# Patient Record
Sex: MALE | Race: WHITE | Employment: FULL TIME | URBAN - METROPOLITAN AREA
[De-identification: names, ages, dates, MRNs, and addresses within clinical notes are randomized per-mention and may not be internally consistent; named-entity substitution may affect disease eponyms.]

---

## 2017-03-27 ENCOUNTER — OFFICE VISIT (OUTPATIENT)
Dept: PRIMARY CARE CLINIC | Age: 42
End: 2017-03-27

## 2017-03-27 VITALS
RESPIRATION RATE: 16 BRPM | HEART RATE: 78 BPM | SYSTOLIC BLOOD PRESSURE: 120 MMHG | WEIGHT: 230 LBS | DIASTOLIC BLOOD PRESSURE: 84 MMHG | TEMPERATURE: 97.7 F | BODY MASS INDEX: 31.15 KG/M2 | OXYGEN SATURATION: 96 % | HEIGHT: 72 IN

## 2017-03-27 DIAGNOSIS — L73.8 FOLLICULITIS BARBAE: Primary | ICD-10-CM

## 2017-03-27 PROCEDURE — 99213 OFFICE O/P EST LOW 20 MIN: CPT | Performed by: INTERNAL MEDICINE

## 2017-03-27 RX ORDER — CEPHALEXIN 500 MG/1
500 CAPSULE ORAL 4 TIMES DAILY
Qty: 40 CAPSULE | Refills: 0 | Status: SHIPPED | OUTPATIENT
Start: 2017-03-27 | End: 2017-05-12

## 2017-03-27 RX ORDER — SULFAMETHOXAZOLE AND TRIMETHOPRIM 800; 160 MG/1; MG/1
1 TABLET ORAL 2 TIMES DAILY
Qty: 20 TABLET | Refills: 0 | Status: SHIPPED | OUTPATIENT
Start: 2017-03-27 | End: 2017-04-06

## 2017-03-27 ASSESSMENT — PATIENT HEALTH QUESTIONNAIRE - PHQ9
1. LITTLE INTEREST OR PLEASURE IN DOING THINGS: 0
SUM OF ALL RESPONSES TO PHQ9 QUESTIONS 1 & 2: 0
SUM OF ALL RESPONSES TO PHQ QUESTIONS 1-9: 0
2. FEELING DOWN, DEPRESSED OR HOPELESS: 0

## 2017-03-30 ASSESSMENT — ENCOUNTER SYMPTOMS
CHOKING: 0
PHOTOPHOBIA: 0
SHORTNESS OF BREATH: 0
VOMITING: 0
TROUBLE SWALLOWING: 0
VOICE CHANGE: 0
NAUSEA: 0

## 2017-05-12 ENCOUNTER — OFFICE VISIT (OUTPATIENT)
Dept: PRIMARY CARE CLINIC | Age: 42
End: 2017-05-12

## 2017-05-12 VITALS
HEART RATE: 70 BPM | SYSTOLIC BLOOD PRESSURE: 128 MMHG | BODY MASS INDEX: 30.03 KG/M2 | WEIGHT: 221.7 LBS | TEMPERATURE: 97.7 F | HEIGHT: 72 IN | DIASTOLIC BLOOD PRESSURE: 80 MMHG | RESPIRATION RATE: 14 BRPM

## 2017-05-12 DIAGNOSIS — E78.2 MIXED HYPERLIPIDEMIA: Primary | ICD-10-CM

## 2017-05-12 DIAGNOSIS — R59.0 CERVICAL LYMPHADENOPATHY: ICD-10-CM

## 2017-05-12 DIAGNOSIS — H61.23 BILATERAL IMPACTED CERUMEN: ICD-10-CM

## 2017-05-12 PROCEDURE — 99213 OFFICE O/P EST LOW 20 MIN: CPT | Performed by: FAMILY MEDICINE

## 2017-05-12 ASSESSMENT — ENCOUNTER SYMPTOMS
COUGH: 0
SHORTNESS OF BREATH: 0
CHANGE IN BOWEL HABIT: 0
NAUSEA: 1
WHEEZING: 0
VOMITING: 0
DIARRHEA: 0
RHINORRHEA: 0
ABDOMINAL PAIN: 0
SORE THROAT: 0
VISUAL CHANGE: 0
CONSTIPATION: 0
SWOLLEN GLANDS: 0
BACK PAIN: 0

## 2017-07-27 ENCOUNTER — OFFICE VISIT (OUTPATIENT)
Dept: PRIMARY CARE CLINIC | Age: 42
End: 2017-07-27

## 2017-07-27 VITALS
RESPIRATION RATE: 16 BRPM | HEART RATE: 76 BPM | WEIGHT: 224 LBS | OXYGEN SATURATION: 97 % | TEMPERATURE: 95.5 F | SYSTOLIC BLOOD PRESSURE: 120 MMHG | BODY MASS INDEX: 26.45 KG/M2 | HEIGHT: 77 IN | DIASTOLIC BLOOD PRESSURE: 80 MMHG

## 2017-07-27 DIAGNOSIS — J01.00 ACUTE NON-RECURRENT MAXILLARY SINUSITIS: Primary | ICD-10-CM

## 2017-07-27 DIAGNOSIS — J30.2 SEASONAL ALLERGIC RHINITIS, UNSPECIFIED ALLERGIC RHINITIS TRIGGER: ICD-10-CM

## 2017-07-27 PROCEDURE — 99213 OFFICE O/P EST LOW 20 MIN: CPT | Performed by: FAMILY MEDICINE

## 2017-07-27 RX ORDER — AMOXICILLIN AND CLAVULANATE POTASSIUM 875; 125 MG/1; MG/1
1 TABLET, FILM COATED ORAL 2 TIMES DAILY
Qty: 20 TABLET | Refills: 0 | Status: SHIPPED | OUTPATIENT
Start: 2017-07-27 | End: 2017-08-06

## 2017-07-27 RX ORDER — IPRATROPIUM BROMIDE 42 UG/1
2 SPRAY, METERED NASAL 2 TIMES DAILY
Qty: 1 BOTTLE | Refills: 3 | Status: SHIPPED | OUTPATIENT
Start: 2017-07-27 | End: 2018-07-27

## 2017-07-27 ASSESSMENT — ENCOUNTER SYMPTOMS
SHORTNESS OF BREATH: 1
CONSTIPATION: 0
HOARSE VOICE: 0
SINUS PRESSURE: 1
ABDOMINAL PAIN: 0
SWOLLEN GLANDS: 0
NAUSEA: 0
DIARRHEA: 0
COUGH: 0
WHEEZING: 0
VOMITING: 0
SORE THROAT: 0
BACK PAIN: 0

## 2018-01-11 RX ORDER — CEFUROXIME AXETIL 500 MG/1
500 TABLET ORAL 2 TIMES DAILY
Qty: 20 TABLET | Refills: 0 | Status: SHIPPED | OUTPATIENT
Start: 2018-01-11 | End: 2018-01-21

## 2018-06-05 ENCOUNTER — HOSPITAL ENCOUNTER (OUTPATIENT)
Dept: GENERAL RADIOLOGY | Age: 43
Discharge: HOME | End: 2018-06-05

## 2018-06-05 DIAGNOSIS — S23.9XXA THORACIC SPRAIN: ICD-10-CM

## 2018-06-05 DIAGNOSIS — S16.1XXA STRAIN OF NECK MUSCLE, INITIAL ENCOUNTER: ICD-10-CM

## 2019-06-14 ENCOUNTER — TELEPHONE (OUTPATIENT)
Dept: ADMINISTRATIVE | Age: 44
End: 2019-06-14

## 2023-08-01 ENCOUNTER — TELEPHONE (OUTPATIENT)
Dept: PRIMARY CARE | Facility: CLINIC | Age: 48
End: 2023-08-01
Payer: COMMERCIAL

## 2023-08-01 NOTE — TELEPHONE ENCOUNTER
PT OF [facility] CALLED IN AVERAGING 160/100 IN BP. PT WAS TOLD BY SPECIALIST (PSYCHIATRIST) THAT HE SHOULD CONTACT HIS PCP TO INFORM HIM OF HIS BP.     PT WOULD LIKE TO KNOW WHAT HE SHOULD DO UNTIL HIS APT ON 08/22.     08/01/23 162/103    PT HAS EXPERIENCE SLIGHT DIFFICULTY BREATHING, DIZZY SPELLS.

## 2023-08-02 ENCOUNTER — APPOINTMENT (OUTPATIENT)
Dept: PRIMARY CARE | Facility: CLINIC | Age: 48
End: 2023-08-02
Payer: COMMERCIAL

## 2023-08-22 ENCOUNTER — OFFICE VISIT (OUTPATIENT)
Dept: PRIMARY CARE | Facility: CLINIC | Age: 48
End: 2023-08-22
Payer: COMMERCIAL

## 2023-08-22 VITALS
DIASTOLIC BLOOD PRESSURE: 90 MMHG | WEIGHT: 254.2 LBS | BODY MASS INDEX: 30.95 KG/M2 | HEIGHT: 76 IN | OXYGEN SATURATION: 97 % | SYSTOLIC BLOOD PRESSURE: 142 MMHG | HEART RATE: 65 BPM

## 2023-08-22 DIAGNOSIS — E66.09 EXOGENOUS OBESITY: ICD-10-CM

## 2023-08-22 DIAGNOSIS — F43.10 POSTTRAUMATIC STRESS DISORDER: ICD-10-CM

## 2023-08-22 DIAGNOSIS — I10 PRIMARY HYPERTENSION: Primary | ICD-10-CM

## 2023-08-22 DIAGNOSIS — F32.9 REACTIVE DEPRESSION: ICD-10-CM

## 2023-08-22 PROBLEM — R59.9 REACTIVE LYMPHADENOPATHY: Status: ACTIVE | Noted: 2023-08-22

## 2023-08-22 PROCEDURE — 99212 OFFICE O/P EST SF 10 MIN: CPT | Performed by: FAMILY MEDICINE

## 2023-08-22 RX ORDER — MIRTAZAPINE 15 MG/1
15 TABLET, FILM COATED ORAL NIGHTLY
COMMUNITY
Start: 2023-08-18 | End: 2023-10-30 | Stop reason: ALTCHOICE

## 2023-08-22 RX ORDER — PRAZOSIN HYDROCHLORIDE 2 MG/1
2 CAPSULE ORAL NIGHTLY
COMMUNITY
Start: 2023-08-18

## 2023-08-22 RX ORDER — PAROXETINE 30 MG/1
30 TABLET, FILM COATED ORAL EVERY MORNING
COMMUNITY

## 2023-08-22 RX ORDER — ERGOCALCIFEROL 1.25 MG/1
50000 CAPSULE ORAL
COMMUNITY

## 2023-08-22 RX ORDER — PAROXETINE HYDROCHLORIDE 20 MG/1
20 TABLET, FILM COATED ORAL
COMMUNITY
Start: 2023-08-18 | End: 2023-08-22 | Stop reason: WASHOUT

## 2023-08-22 RX ORDER — PAROXETINE 30 MG/1
60 TABLET, FILM COATED ORAL DAILY
COMMUNITY
End: 2023-08-22 | Stop reason: WASHOUT

## 2023-08-22 RX ORDER — HYDROCODONE BITARTRATE AND ACETAMINOPHEN 5; 325 MG/1; MG/1
1 TABLET ORAL EVERY 6 HOURS PRN
COMMUNITY
Start: 2022-12-08 | End: 2023-08-22 | Stop reason: WASHOUT

## 2023-08-22 RX ORDER — LOSARTAN POTASSIUM 100 MG/1
100 TABLET ORAL DAILY
Qty: 30 TABLET | Refills: 2 | Status: SHIPPED | OUTPATIENT
Start: 2023-08-22 | End: 2023-09-20

## 2023-08-22 ASSESSMENT — ENCOUNTER SYMPTOMS
SINUS PRESSURE: 0
FEVER: 0
MYALGIAS: 0
SEIZURES: 0
CONSTIPATION: 0
ARTHRALGIAS: 0
DECREASED CONCENTRATION: 0
COLOR CHANGE: 0
HEMATURIA: 0
CHEST TIGHTNESS: 0
PALPITATIONS: 0
EYE PAIN: 0
COUGH: 0
BLOOD IN STOOL: 0
POLYDIPSIA: 0
CONSTITUTIONAL NEGATIVE: 1
ADENOPATHY: 0
RHINORRHEA: 0
NERVOUS/ANXIOUS: 0
SLEEP DISTURBANCE: 0
HEADACHES: 0
DIZZINESS: 1
ABDOMINAL DISTENTION: 0
DIARRHEA: 0
APPETITE CHANGE: 0
ABDOMINAL PAIN: 0
PHOTOPHOBIA: 0
STRIDOR: 0
NECK STIFFNESS: 0
SINUS PAIN: 0
SPEECH DIFFICULTY: 0
FATIGUE: 0
RECTAL PAIN: 0
DYSURIA: 0
TROUBLE SWALLOWING: 0
ACTIVITY CHANGE: 0
POLYPHAGIA: 0
CONFUSION: 0
SORE THROAT: 0
AGITATION: 0
DYSPHORIC MOOD: 0
SHORTNESS OF BREATH: 0
FLANK PAIN: 0

## 2023-08-22 ASSESSMENT — PATIENT HEALTH QUESTIONNAIRE - PHQ9
SUM OF ALL RESPONSES TO PHQ9 QUESTIONS 1 AND 2: 0
2. FEELING DOWN, DEPRESSED OR HOPELESS: NOT AT ALL
1. LITTLE INTEREST OR PLEASURE IN DOING THINGS: NOT AT ALL

## 2023-08-22 NOTE — PATIENT INSTRUCTIONS
Follow up 5-10 days after ECHO     Continue current medications and therapy for chronic medical conditions.    Patient was advised importance of proper diet/nutrition in addition adequate hydration. Patient was encouraged moderate exercise program to include 30 minutes daily for 5 days of the week or 150 minutes weekly. Patient will follow-up with us as scheduled.    ECHO ordered     CMP, Lipid and HEP C antibody ordered     Start Losartan 100mg daily

## 2023-08-22 NOTE — PROGRESS NOTES
"Subjective   Patient ID: Justin Hanna is a 48 y.o. male who presents for Hypertension.    Patient has noticed from other doctors he has had elevated blood pressure. Patient states he has noticed at time he has dizziness and trouble catching his breath but is unsure if it is from his elevated blood pressure or from his head injury through [facility]. Patient is currently not on an medications for HTN and does not check his BP at home.     Patient denies any other concerns at this time.          Review of Systems   Constitutional: Negative.  Negative for activity change, appetite change, fatigue and fever.   HENT:  Negative for congestion, dental problem, ear discharge, ear pain, mouth sores, rhinorrhea, sinus pressure, sinus pain, sore throat, tinnitus and trouble swallowing.    Eyes:  Negative for photophobia, pain and visual disturbance.   Respiratory:  Negative for cough, chest tightness, shortness of breath and stridor.    Cardiovascular:  Negative for chest pain and palpitations.   Gastrointestinal:  Negative for abdominal distention, abdominal pain, blood in stool, constipation, diarrhea and rectal pain.   Endocrine: Negative for cold intolerance, heat intolerance, polydipsia, polyphagia and polyuria.   Genitourinary:  Negative for dysuria, flank pain, hematuria and urgency.   Musculoskeletal:  Negative for arthralgias, gait problem, myalgias and neck stiffness.   Skin:  Negative for color change and rash.   Allergic/Immunologic: Negative for environmental allergies and food allergies.   Neurological:  Positive for dizziness. Negative for seizures, syncope, speech difficulty and headaches.   Hematological:  Negative for adenopathy.   Psychiatric/Behavioral:  Negative for agitation, confusion, decreased concentration, dysphoric mood and sleep disturbance. The patient is not nervous/anxious.        Objective   /90   Pulse 65   Ht 1.93 m (6' 4\")   Wt 115 kg (254 lb 3.2 oz)   SpO2 97%   BMI 30.94 kg/m² "     Physical Exam  Vitals reviewed.   Constitutional:       General: He is not in acute distress.     Appearance: He is obese. He is not ill-appearing or diaphoretic.   HENT:      Head: Normocephalic.      Right Ear: Tympanic membrane and external ear normal.      Left Ear: Tympanic membrane and external ear normal.      Nose: Nose normal. No congestion.      Mouth/Throat:      Pharynx: No posterior oropharyngeal erythema.   Eyes:      General:         Right eye: No discharge.         Left eye: No discharge.      Extraocular Movements: Extraocular movements intact.      Conjunctiva/sclera: Conjunctivae normal.      Pupils: Pupils are equal, round, and reactive to light.   Cardiovascular:      Rate and Rhythm: Normal rate and regular rhythm.      Pulses: Normal pulses.      Heart sounds: Normal heart sounds. No murmur heard.  Pulmonary:      Effort: Pulmonary effort is normal. No respiratory distress.      Breath sounds: Normal breath sounds. No wheezing or rales.   Chest:      Chest wall: No tenderness.   Abdominal:      General: Bowel sounds are normal. There is distension.      Palpations: There is no mass.      Tenderness: There is no abdominal tenderness. There is no guarding.   Musculoskeletal:         General: No tenderness. Normal range of motion.      Cervical back: Normal range of motion and neck supple. No tenderness.      Right lower leg: No edema.      Left lower leg: No edema.   Skin:     General: Skin is warm and dry.      Coloration: Skin is not jaundiced.      Findings: No bruising or erythema.   Neurological:      General: No focal deficit present.      Mental Status: He is alert and oriented to person, place, and time. Mental status is at baseline.      Cranial Nerves: No cranial nerve deficit.      Sensory: No sensory deficit.      Coordination: Coordination normal.      Gait: Gait normal.   Psychiatric:         Mood and Affect: Mood normal.         Thought Content: Thought content normal.          Judgment: Judgment normal.         Assessment/Plan   Problem List Items Addressed This Visit       Posttraumatic stress disorder     Other Visit Diagnoses       Primary hypertension    -  Primary    Relevant Medications    losartan (Cozaar) 100 mg tablet    Other Relevant Orders    Transthoracic Echo (TTE) Complete    Comprehensive metabolic panel    Hepatitis C antibody    Lipid panel    Exogenous obesity        Reactive depression               Scribe Attestation  By signing my name below, ITri , Scribe   attest that this documentation has been prepared under the direction and in the presence of Elpidio Jean Baptiste DO.  Provider Attestation - Scribe documentation  All medical record entries made by the Scribe were at my direction and personally dictated by me. I have reviewed the chart and agree that the record accurately reflects my personal performance of the history, physical exam, discussion and plan.

## 2023-09-07 ENCOUNTER — TELEPHONE (OUTPATIENT)
Dept: PRIMARY CARE | Facility: CLINIC | Age: 48
End: 2023-09-07

## 2023-09-20 DIAGNOSIS — I10 PRIMARY HYPERTENSION: ICD-10-CM

## 2023-09-20 RX ORDER — LOSARTAN POTASSIUM 100 MG/1
100 TABLET ORAL DAILY
Qty: 30 TABLET | Refills: 2 | Status: SHIPPED | OUTPATIENT
Start: 2023-09-20 | End: 2023-10-30 | Stop reason: ALTCHOICE

## 2023-10-30 ENCOUNTER — OFFICE VISIT (OUTPATIENT)
Dept: PRIMARY CARE | Facility: CLINIC | Age: 48
End: 2023-10-30
Payer: COMMERCIAL

## 2023-10-30 VITALS
HEART RATE: 87 BPM | OXYGEN SATURATION: 95 % | BODY MASS INDEX: 30.2 KG/M2 | SYSTOLIC BLOOD PRESSURE: 126 MMHG | HEIGHT: 76 IN | RESPIRATION RATE: 16 BRPM | DIASTOLIC BLOOD PRESSURE: 74 MMHG | WEIGHT: 248 LBS

## 2023-10-30 DIAGNOSIS — I10 PRIMARY HYPERTENSION: Primary | ICD-10-CM

## 2023-10-30 DIAGNOSIS — I27.20 PULMONARY HYPERTENSION (MULTI): ICD-10-CM

## 2023-10-30 DIAGNOSIS — J30.9 ALLERGIC RHINITIS, UNSPECIFIED SEASONALITY, UNSPECIFIED TRIGGER: ICD-10-CM

## 2023-10-30 DIAGNOSIS — R06.02 SHORTNESS OF BREATH: ICD-10-CM

## 2023-10-30 DIAGNOSIS — I51.7 RIGHT VENTRICULAR HYPERTROPHY: ICD-10-CM

## 2023-10-30 PROCEDURE — 99212 OFFICE O/P EST SF 10 MIN: CPT | Performed by: FAMILY MEDICINE

## 2023-10-30 RX ORDER — VIT C/E/ZN/COPPR/LUTEIN/ZEAXAN 250MG-90MG
1 CAPSULE ORAL
COMMUNITY

## 2023-10-30 RX ORDER — LOSARTAN POTASSIUM AND HYDROCHLOROTHIAZIDE 12.5; 5 MG/1; MG/1
1 TABLET ORAL DAILY
Qty: 30 TABLET | Refills: 2 | Status: SHIPPED | OUTPATIENT
Start: 2023-10-30 | End: 2023-11-21

## 2023-10-30 RX ORDER — FLUTICASONE PROPIONATE 50 MCG
1 SPRAY, SUSPENSION (ML) NASAL DAILY
Qty: 16 G | Refills: 1 | Status: SHIPPED | OUTPATIENT
Start: 2023-10-30 | End: 2023-11-21

## 2023-10-30 RX ORDER — AA/PROT/LYSINE/METHIO/VIT C/B6 50-12.5 MG
10 TABLET ORAL
COMMUNITY

## 2023-10-30 RX ORDER — MIRTAZAPINE 7.5 MG/1
7.5 TABLET, FILM COATED ORAL NIGHTLY
COMMUNITY
Start: 2023-10-05

## 2023-10-30 ASSESSMENT — ENCOUNTER SYMPTOMS
ABDOMINAL PAIN: 0
CONSTITUTIONAL NEGATIVE: 1
CONFUSION: 0
EYE PAIN: 0
SINUS PAIN: 0
SORE THROAT: 0
SINUS PRESSURE: 0
POLYDIPSIA: 0
PHOTOPHOBIA: 0
DIARRHEA: 0
ARTHRALGIAS: 0
RECTAL PAIN: 0
FEVER: 0
CHEST TIGHTNESS: 0
PALPITATIONS: 0
HYPERTENSION: 1
DECREASED CONCENTRATION: 0
FATIGUE: 0
DYSURIA: 0
SLEEP DISTURBANCE: 0
CONSTIPATION: 0
BLOOD IN STOOL: 0
COLOR CHANGE: 0
SPEECH DIFFICULTY: 0
MYALGIAS: 0
POLYPHAGIA: 0
COUGH: 0
APPETITE CHANGE: 0
STRIDOR: 0
NERVOUS/ANXIOUS: 0
HEMATURIA: 0
FLANK PAIN: 0
NECK STIFFNESS: 0
RHINORRHEA: 0
SEIZURES: 0
SHORTNESS OF BREATH: 1
AGITATION: 0
ADENOPATHY: 0
HEADACHES: 0
DIZZINESS: 0
ACTIVITY CHANGE: 0
TROUBLE SWALLOWING: 0
ABDOMINAL DISTENTION: 0
DYSPHORIC MOOD: 0

## 2023-10-30 NOTE — PROGRESS NOTES
Subjective   Patient ID: Justin Hanna is a 48 y.o. male who presents for Hypertension.    Hypertension  Associated symptoms include shortness of breath. Pertinent negatives include no chest pain, headaches or palpitations.    Patient is here for Hypertension and review of Echocardiogram done on 10/24/23. Results from [facility] and printed for provider.     He has reduced use of alcohol, reduced coffee intake to 1 cup, changed diet, lost weight and BP is till 135/85. He is having dizziness with standing up . He has lost 6 lbs.   He does get some SOB at rest.       Review of Systems   Constitutional: Negative.  Negative for activity change, appetite change, fatigue and fever.   HENT:  Negative for congestion, dental problem, ear discharge, ear pain, mouth sores, rhinorrhea, sinus pressure, sinus pain, sore throat, tinnitus and trouble swallowing.    Eyes:  Negative for photophobia, pain and visual disturbance.   Respiratory:  Positive for shortness of breath. Negative for cough, chest tightness and stridor.    Cardiovascular:  Negative for chest pain and palpitations.   Gastrointestinal:  Negative for abdominal distention, abdominal pain, blood in stool, constipation, diarrhea and rectal pain.   Endocrine: Negative for cold intolerance, heat intolerance, polydipsia, polyphagia and polyuria.   Genitourinary:  Negative for dysuria, flank pain, hematuria and urgency.   Musculoskeletal:  Negative for arthralgias, gait problem, myalgias and neck stiffness.   Skin:  Negative for color change and rash.   Allergic/Immunologic: Negative for environmental allergies and food allergies.   Neurological:  Negative for dizziness, seizures, syncope, speech difficulty and headaches.   Hematological:  Negative for adenopathy.   Psychiatric/Behavioral:  Negative for agitation, confusion, decreased concentration, dysphoric mood and sleep disturbance. The patient is not nervous/anxious.        Objective   /74   Pulse 87   Resp 16   Ht  "1.93 m (6' 4\")   Wt 112 kg (248 lb)   SpO2 95%   BMI 30.19 kg/m²     Physical Exam  Vitals reviewed.   Constitutional:       General: He is not in acute distress.     Appearance: Normal appearance. He is normal weight. He is not ill-appearing or diaphoretic.   HENT:      Head: Normocephalic.      Right Ear: Tympanic membrane and external ear normal.      Left Ear: Tympanic membrane and external ear normal.      Nose: Nose normal. No congestion.      Mouth/Throat:      Pharynx: No posterior oropharyngeal erythema.   Eyes:      General:         Right eye: No discharge.         Left eye: No discharge.      Extraocular Movements: Extraocular movements intact.      Conjunctiva/sclera: Conjunctivae normal.      Pupils: Pupils are equal, round, and reactive to light.   Cardiovascular:      Rate and Rhythm: Normal rate and regular rhythm.      Pulses: Normal pulses.      Heart sounds: Normal heart sounds. No murmur heard.  Pulmonary:      Effort: Pulmonary effort is normal. No respiratory distress.      Breath sounds: Normal breath sounds. No wheezing or rales.   Chest:      Chest wall: No tenderness.   Abdominal:      General: Abdomen is flat. Bowel sounds are normal. There is no distension.      Palpations: There is no mass.      Tenderness: There is no abdominal tenderness. There is no guarding.   Musculoskeletal:         General: No tenderness. Normal range of motion.      Cervical back: Normal range of motion and neck supple. No tenderness.      Right lower leg: No edema.      Left lower leg: No edema.   Skin:     General: Skin is dry.      Coloration: Skin is not jaundiced.      Findings: No bruising, erythema or rash.   Neurological:      General: No focal deficit present.      Mental Status: He is alert and oriented to person, place, and time. Mental status is at baseline.      Cranial Nerves: No cranial nerve deficit.      Sensory: No sensory deficit.      Coordination: Coordination normal.      Gait: Gait " normal.   Psychiatric:         Mood and Affect: Mood normal.         Thought Content: Thought content normal.         Judgment: Judgment normal.         Assessment/Plan   Problem List Items Addressed This Visit             ICD-10-CM    Primary hypertension - Primary I10    Relevant Medications    losartan-hydrochlorothiazide (Hyzaar) 50-12.5 mg tablet    Other Relevant Orders    Referral to Cardiology    Right ventricular hypertrophy I51.7    Relevant Orders    Referral to Cardiology    Pulmonary hypertension (CMS/[facility]) I27.20    Relevant Orders    Referral to Cardiology     Other Visit Diagnoses         Codes    Shortness of breath     R06.02    Relevant Orders    Referral to Pulmonology    Allergic rhinitis, unspecified seasonality, unspecified trigger     J30.9    Relevant Medications    fluticasone (Flonase) 50 mcg/actuation nasal spray              Scribe Attestation  By signing my name below, I, HERMINIO Christine , Yusufibcornelio   attest that this documentation has been prepared under the direction and in the presence of Elpidio Jean Baptiste DO.   Provider Attestation - Scribe documentation    All medical record entries made by the Scribe were at my direction and personally dictated by me. I have reviewed the chart and agree that the record accurately reflects my personal performance of the history, physical exam, discussion and plan.

## 2023-10-30 NOTE — PATIENT INSTRUCTIONS
Follow up in 6 WEEKS     Continue current medications and therapy for chronic medical conditions.    Patient was advised importance of proper diet/nutrition in addition adequate hydration. Patient was encouraged moderate exercise program to include 30 minutes daily for 5 days of the week or 150 minutes weekly. Patient will follow-up with us as scheduled.    STOP LOSARTAN     START LOSARTAN- HYDROCHLOROTHIAZIDE 50-12.5MG ONCE DAILY     REFERRED TO CARDIOLOGY     REFERRED TO PULMONOLOGY    Detail Level: Detailed Quality 226: Preventive Care And Screening: Tobacco Use: Screening And Cessation Intervention: Patient screened for tobacco use and is an ex/non-smoker Quality 130: Documentation Of Current Medications In The Medical Record: Current Medications Documented

## 2023-11-21 DIAGNOSIS — J30.9 ALLERGIC RHINITIS, UNSPECIFIED SEASONALITY, UNSPECIFIED TRIGGER: ICD-10-CM

## 2023-11-21 DIAGNOSIS — I10 PRIMARY HYPERTENSION: ICD-10-CM

## 2023-11-21 RX ORDER — LOSARTAN POTASSIUM AND HYDROCHLOROTHIAZIDE 12.5; 5 MG/1; MG/1
1 TABLET ORAL DAILY
Qty: 90 TABLET | Refills: 1 | Status: SHIPPED | OUTPATIENT
Start: 2023-11-21 | End: 2024-05-28 | Stop reason: WASHOUT

## 2023-11-21 RX ORDER — FLUTICASONE PROPIONATE 50 MCG
1 SPRAY, SUSPENSION (ML) NASAL DAILY
Qty: 48 ML | Refills: 1 | Status: SHIPPED | OUTPATIENT
Start: 2023-11-21

## 2023-12-11 ENCOUNTER — OFFICE VISIT (OUTPATIENT)
Dept: PRIMARY CARE | Facility: CLINIC | Age: 48
End: 2023-12-11
Payer: COMMERCIAL

## 2023-12-11 VITALS
HEIGHT: 76 IN | RESPIRATION RATE: 15 BRPM | SYSTOLIC BLOOD PRESSURE: 142 MMHG | WEIGHT: 247 LBS | OXYGEN SATURATION: 93 % | DIASTOLIC BLOOD PRESSURE: 80 MMHG | BODY MASS INDEX: 30.08 KG/M2 | HEART RATE: 72 BPM | TEMPERATURE: 97.2 F

## 2023-12-11 DIAGNOSIS — Z12.12 ENCOUNTER FOR COLORECTAL CANCER SCREENING: ICD-10-CM

## 2023-12-11 DIAGNOSIS — Z12.11 ENCOUNTER FOR COLORECTAL CANCER SCREENING: ICD-10-CM

## 2023-12-11 DIAGNOSIS — I51.7 RVH (RIGHT VENTRICULAR HYPERTROPHY): ICD-10-CM

## 2023-12-11 DIAGNOSIS — I27.20 PULMONARY HYPERTENSION (MULTI): ICD-10-CM

## 2023-12-11 DIAGNOSIS — I10 PRIMARY HYPERTENSION: Primary | ICD-10-CM

## 2023-12-11 PROCEDURE — 99212 OFFICE O/P EST SF 10 MIN: CPT | Performed by: FAMILY MEDICINE

## 2023-12-11 ASSESSMENT — ENCOUNTER SYMPTOMS
NECK STIFFNESS: 0
ABDOMINAL PAIN: 0
CHEST TIGHTNESS: 0
RHINORRHEA: 0
APPETITE CHANGE: 0
DIARRHEA: 0
SINUS PRESSURE: 0
CONSTIPATION: 0
PALPITATIONS: 0
RECTAL PAIN: 0
SINUS PAIN: 0
COUGH: 0
NERVOUS/ANXIOUS: 0
HEMATURIA: 0
SPEECH DIFFICULTY: 0
SLEEP DISTURBANCE: 0
SORE THROAT: 0
DECREASED CONCENTRATION: 0
AGITATION: 0
ADENOPATHY: 0
EYE PAIN: 0
SHORTNESS OF BREATH: 0
CONSTITUTIONAL NEGATIVE: 1
POLYPHAGIA: 0
ACTIVITY CHANGE: 0
TROUBLE SWALLOWING: 0
CONFUSION: 0
POLYDIPSIA: 0
BLOOD IN STOOL: 0
HEADACHES: 0
DIZZINESS: 0
SEIZURES: 0
HYPERTENSION: 1
STRIDOR: 0
DYSPHORIC MOOD: 0
ABDOMINAL DISTENTION: 0
FATIGUE: 0
ARTHRALGIAS: 0
FEVER: 0
COLOR CHANGE: 0
DYSURIA: 0
MYALGIAS: 0
FLANK PAIN: 0
PHOTOPHOBIA: 0

## 2023-12-11 NOTE — PROGRESS NOTES
"Subjective   Patient ID: Justin Hanna is a 48 y.o. male who presents for Hypertension.    Patient is here for follow up on hypertension.    Patient would like discuss colonoscopy.    Patient denies any symptoms or concerns today.    Hypertension  Pertinent negatives include no chest pain, headaches, palpitations or shortness of breath.        Review of Systems   Constitutional: Negative.  Negative for activity change, appetite change, fatigue and fever.   HENT:  Negative for congestion, dental problem, ear discharge, ear pain, mouth sores, rhinorrhea, sinus pressure, sinus pain, sore throat, tinnitus and trouble swallowing.    Eyes:  Negative for photophobia, pain and visual disturbance.   Respiratory:  Negative for cough, chest tightness, shortness of breath and stridor.    Cardiovascular:  Negative for chest pain and palpitations.   Gastrointestinal:  Negative for abdominal distention, abdominal pain, blood in stool, constipation, diarrhea and rectal pain.   Endocrine: Negative for cold intolerance, heat intolerance, polydipsia, polyphagia and polyuria.   Genitourinary:  Negative for dysuria, flank pain, hematuria and urgency.   Musculoskeletal:  Negative for arthralgias, gait problem, myalgias and neck stiffness.   Skin:  Negative for color change and rash.   Allergic/Immunologic: Negative for environmental allergies and food allergies.   Neurological:  Negative for dizziness, seizures, syncope, speech difficulty and headaches.   Hematological:  Negative for adenopathy.   Psychiatric/Behavioral:  Negative for agitation, confusion, decreased concentration, dysphoric mood and sleep disturbance. The patient is not nervous/anxious.        Objective   /80 (BP Location: Right arm, Patient Position: Sitting, BP Cuff Size: Large adult)   Pulse 72   Temp 36.2 °C (97.2 °F)   Resp 15   Ht 1.93 m (6' 4\")   Wt 112 kg (247 lb)   SpO2 93%   BMI 30.07 kg/m²     Physical Exam  Vitals reviewed.   Constitutional:       " General: He is not in acute distress.     Appearance: Normal appearance. He is normal weight. He is not ill-appearing or diaphoretic.   HENT:      Head: Normocephalic.      Right Ear: Tympanic membrane and external ear normal.      Left Ear: Tympanic membrane and external ear normal.      Nose: Nose normal. No congestion.      Mouth/Throat:      Pharynx: No posterior oropharyngeal erythema.   Eyes:      General:         Right eye: No discharge.         Left eye: No discharge.      Extraocular Movements: Extraocular movements intact.      Conjunctiva/sclera: Conjunctivae normal.      Pupils: Pupils are equal, round, and reactive to light.   Cardiovascular:      Rate and Rhythm: Normal rate and regular rhythm.      Pulses: Normal pulses.      Heart sounds: Normal heart sounds. No murmur heard.  Pulmonary:      Effort: Pulmonary effort is normal. No respiratory distress.      Breath sounds: Normal breath sounds. No wheezing or rales.   Chest:      Chest wall: No tenderness.   Abdominal:      General: Abdomen is flat. Bowel sounds are normal. There is no distension.      Palpations: There is no mass.      Tenderness: There is no abdominal tenderness. There is no guarding.   Musculoskeletal:         General: No tenderness. Normal range of motion.      Cervical back: Normal range of motion and neck supple. No tenderness.      Right lower leg: No edema.      Left lower leg: No edema.   Skin:     General: Skin is dry.      Coloration: Skin is not jaundiced.      Findings: No bruising, erythema or rash.   Neurological:      General: No focal deficit present.      Mental Status: He is alert and oriented to person, place, and time. Mental status is at baseline.      Cranial Nerves: No cranial nerve deficit.      Sensory: No sensory deficit.      Coordination: Coordination normal.      Gait: Gait normal.   Psychiatric:         Mood and Affect: Mood normal.         Thought Content: Thought content normal.         Judgment:  Judgment normal.         Assessment/Plan   Problem List Items Addressed This Visit             ICD-10-CM    Primary hypertension - Primary I10    Pulmonary hypertension (CMS/HCC) I27.20     Other Visit Diagnoses         Codes    Encounter for colorectal cancer screening     Z12.11, Z12.12    Relevant Orders    Colonoscopy Screening; Average Risk Patient    RVH (right ventricular hypertrophy)     I51.7    Relevant Medications    empagliflozin (Jardiance) 10 mg              Scribe Attestation  By signing my name below, I, HERMINIO Christine Scribe   attest that this documentation has been prepared under the direction and in the presence of Elpidio Jean Baptiste DO.   Provider Attestation - Scribe documentation    All medical record entries made by the Scribe were at my direction and personally dictated by me. I have reviewed the chart and agree that the record accurately reflects my personal performance of the history, physical exam, discussion and plan.

## 2023-12-11 NOTE — PATIENT INSTRUCTIONS
Follow up in 3 months    Continue current medications and therapy for chronic medical conditions.    Patient was advised importance of proper diet/nutrition in addition adequate hydration. Patient was encouraged moderate exercise program to include 30 minutes daily for 5 days of the week or 150 minutes weekly. Patient will follow-up with us as scheduled.    Review lab results from November 2023    COMPLETE COLONOSCOPY    START JARDIANCE 10MG ONCE DAILY

## 2024-01-05 ENCOUNTER — APPOINTMENT (OUTPATIENT)
Dept: PHYSICAL THERAPY | Facility: CLINIC | Age: 49
End: 2024-01-05
Payer: COMMERCIAL

## 2024-01-09 ENCOUNTER — EVALUATION (OUTPATIENT)
Dept: PHYSICAL THERAPY | Facility: CLINIC | Age: 49
End: 2024-01-09
Payer: COMMERCIAL

## 2024-01-09 ENCOUNTER — APPOINTMENT (OUTPATIENT)
Dept: PHYSICAL THERAPY | Facility: CLINIC | Age: 49
End: 2024-01-09
Payer: COMMERCIAL

## 2024-01-09 DIAGNOSIS — M54.2 NECK PAIN: ICD-10-CM

## 2024-01-09 DIAGNOSIS — M62.81 MUSCLE WEAKNESS (GENERALIZED): ICD-10-CM

## 2024-01-09 DIAGNOSIS — M54.50 CHRONIC RIGHT-SIDED LOW BACK PAIN WITHOUT SCIATICA: ICD-10-CM

## 2024-01-09 DIAGNOSIS — G89.29 CHRONIC RIGHT-SIDED LOW BACK PAIN WITHOUT SCIATICA: ICD-10-CM

## 2024-01-09 DIAGNOSIS — M50.221 OTHER CERVICAL DISC DISPLACEMENT AT C4-C5 LEVEL: Primary | ICD-10-CM

## 2024-01-09 PROCEDURE — 97162 PT EVAL MOD COMPLEX 30 MIN: CPT | Mod: GP | Performed by: PHYSICAL THERAPIST

## 2024-01-09 PROCEDURE — 97110 THERAPEUTIC EXERCISES: CPT | Mod: GP | Performed by: PHYSICAL THERAPIST

## 2024-01-09 PROCEDURE — 97140 MANUAL THERAPY 1/> REGIONS: CPT | Mod: GP | Performed by: PHYSICAL THERAPIST

## 2024-01-09 ASSESSMENT — ENCOUNTER SYMPTOMS
LOSS OF SENSATION IN FEET: 0
OCCASIONAL FEELINGS OF UNSTEADINESS: 0
DEPRESSION: 0
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
## 2024-01-11 ENCOUNTER — APPOINTMENT (OUTPATIENT)
Dept: PHYSICAL THERAPY | Facility: CLINIC | Age: 49
End: 2024-01-11
Payer: COMMERCIAL

## 2024-01-12 ENCOUNTER — TREATMENT (OUTPATIENT)
Dept: PHYSICAL THERAPY | Facility: CLINIC | Age: 49
End: 2024-01-12
Payer: COMMERCIAL

## 2024-01-12 DIAGNOSIS — M50.221 OTHER CERVICAL DISC DISPLACEMENT AT C4-C5 LEVEL: ICD-10-CM

## 2024-01-12 DIAGNOSIS — M62.81 MUSCLE WEAKNESS (GENERALIZED): ICD-10-CM

## 2024-01-12 DIAGNOSIS — M54.2 NECK PAIN: ICD-10-CM

## 2024-01-12 DIAGNOSIS — G89.29 CHRONIC RIGHT-SIDED LOW BACK PAIN WITHOUT SCIATICA: Primary | ICD-10-CM

## 2024-01-12 DIAGNOSIS — M54.50 CHRONIC RIGHT-SIDED LOW BACK PAIN WITHOUT SCIATICA: Primary | ICD-10-CM

## 2024-01-12 PROCEDURE — 97140 MANUAL THERAPY 1/> REGIONS: CPT | Mod: GP | Performed by: PHYSICAL THERAPIST

## 2024-01-12 NOTE — PROGRESS NOTES
Physical Therapy  Physical Therapy Treatment    Patient Name: Justin Hanna  MRN: [id number]  Today's Date: 1/12/2024   Visit #2 (of 18 by 2/28/24)  Time in: 0900  Time out: 0940    Assessment:   Pt. reports good compliance with revamped home program. The L lower neck/ scapular elevator area feels better, overall, than prior episode (tension/tenderness is more focal and specific, etc.). We'll likely devote more of one visit per week to lumbar and hip issues and the other to neck/upper back going forward- unless pt. is having breakthrough pain or issues with one or the other on a given day.    Plan:   Pt. returns early this coming week.     Current Problem  1. Chronic right-sided low back pain without sciatica        2. Muscle weakness (generalized)        3. Neck pain        4. Other cervical disc displacement at C4-C5 level          Subjective    Pt. reports that his initial HEP is going okay. Side glides- which we talked about right at the tail end of the session- don't cause any undue pain, etc. He has neck and back stiffness today, in general.    Objective   Treatments:  Manual Therapy (81326): 40 minutes  Palpatory assessment performed.   Dry needling provided to L middle scalene and upper trapezius in R side lying, x2 each in series, with TENS added to elicit local twitch responses to dec resting tonus and associated discomfort.  Soft tissue mobilization provided to the same and to levator and other scalenes, including petrissage, stripping, cross frictions, and MFR to tolerance to promote local blood flow and soft tissue extensibility.  Side glides, grade 3 x5 each C2-3 through 6-7 in supine. C3-4 R stiffer, but not painful.   Closing mobs at the same, grade 4, 2x10. Not much change.   Closing mobilization C3-4 in supine, grade 5 x1, no cavitation, but range improves to close to L.  Reviewed HEP verbally.

## 2024-01-15 ENCOUNTER — TREATMENT (OUTPATIENT)
Dept: PHYSICAL THERAPY | Facility: CLINIC | Age: 49
End: 2024-01-15
Payer: COMMERCIAL

## 2024-01-15 DIAGNOSIS — M50.221 OTHER CERVICAL DISC DISPLACEMENT AT C4-C5 LEVEL: Primary | ICD-10-CM

## 2024-01-15 DIAGNOSIS — M54.50 CHRONIC RIGHT-SIDED LOW BACK PAIN WITHOUT SCIATICA: ICD-10-CM

## 2024-01-15 DIAGNOSIS — M62.81 MUSCLE WEAKNESS (GENERALIZED): ICD-10-CM

## 2024-01-15 DIAGNOSIS — M54.2 NECK PAIN: ICD-10-CM

## 2024-01-15 DIAGNOSIS — G89.29 CHRONIC RIGHT-SIDED LOW BACK PAIN WITHOUT SCIATICA: ICD-10-CM

## 2024-01-15 PROCEDURE — 97140 MANUAL THERAPY 1/> REGIONS: CPT | Mod: GP | Performed by: PHYSICAL THERAPIST

## 2024-01-16 ENCOUNTER — APPOINTMENT (OUTPATIENT)
Dept: PHYSICAL THERAPY | Facility: CLINIC | Age: 49
End: 2024-01-16
Payer: COMMERCIAL

## 2024-01-18 ENCOUNTER — APPOINTMENT (OUTPATIENT)
Dept: PHYSICAL THERAPY | Facility: CLINIC | Age: 49
End: 2024-01-18
Payer: COMMERCIAL

## 2024-01-19 ENCOUNTER — APPOINTMENT (OUTPATIENT)
Dept: PHYSICAL THERAPY | Facility: CLINIC | Age: 49
End: 2024-01-19
Payer: COMMERCIAL

## 2024-01-22 ENCOUNTER — TREATMENT (OUTPATIENT)
Dept: PHYSICAL THERAPY | Facility: CLINIC | Age: 49
End: 2024-01-22
Payer: COMMERCIAL

## 2024-01-22 DIAGNOSIS — G89.29 CHRONIC RIGHT-SIDED LOW BACK PAIN WITHOUT SCIATICA: ICD-10-CM

## 2024-01-22 DIAGNOSIS — M50.221 OTHER CERVICAL DISC DISPLACEMENT AT C4-C5 LEVEL: Primary | ICD-10-CM

## 2024-01-22 DIAGNOSIS — M54.2 NECK PAIN: ICD-10-CM

## 2024-01-22 DIAGNOSIS — M62.81 MUSCLE WEAKNESS (GENERALIZED): ICD-10-CM

## 2024-01-22 DIAGNOSIS — M54.50 CHRONIC RIGHT-SIDED LOW BACK PAIN WITHOUT SCIATICA: ICD-10-CM

## 2024-01-22 PROCEDURE — 97140 MANUAL THERAPY 1/> REGIONS: CPT | Mod: GP | Performed by: PHYSICAL THERAPIST

## 2024-01-22 PROCEDURE — 97110 THERAPEUTIC EXERCISES: CPT | Mod: GP | Performed by: PHYSICAL THERAPIST

## 2024-01-22 NOTE — PROGRESS NOTES
Physical Therapy  Physical Therapy Treatment    Patient Name: Justin Hanna  MRN: [id number]  Today's Date: 1/22/2024   Visit #4 (of 18 by 2/28/24)  Time in: 0910 Time out: 0950    Assessment:   Pt. tolerates treatment well. Will consider adding glute max specific strengthening soon, especially for the L side- but I didn't want to needle there and add ex's in the same day so that we may avoid confounding variables.     Plan:   Pt. returns Friday.    Current Problem  1. Other cervical disc displacement at C4-C5 level        2. Chronic right-sided low back pain without sciatica        3. Muscle weakness (generalized)        4. Neck pain          Subjective    Pt. reports that he had to do some snow shoveling late last week because the weather got so bad. His neck held up okay, but he has been having a lot of R>L LBP since. He reports that Friday it looked like he was walking with a cane, and that things are gradually improving day over day. He reports that it seems like walking provokes his R sided pain more than prolonged standing, while the latter makes the L thigh burn.    Objective   Treatments:  Manual Therapy (35153): 30 minutes  Palpatory assessment performed.   Dry needling provided to upper medial gluteus johanna in prone on each side, x2 each in series, with TENS added to elicit local twitch responses to dec resting tonus and associated discomfort.  Soft tissue mobilization provided to lumbosacral erectors and paraspinals in prone, including petrissage, stripping, cross frictions, and MFR to tolerance to promote local blood flow and soft tissue extensibility.    Therapeutic Exercise (46874): 10 minutes  Prone leg extensions x5 each side with straight leg and x5 with knee flexed to 90 deg. L side- hamstring driven. Weaker with 90 deg. knee flexion. Causes LBP progressively with repetition. R much better/normal, in general.  Discussed adding rows 2-3x/week for strengthening program at home. Demonstration  provided.

## 2024-01-23 ENCOUNTER — APPOINTMENT (OUTPATIENT)
Dept: PHYSICAL THERAPY | Facility: CLINIC | Age: 49
End: 2024-01-23
Payer: COMMERCIAL

## 2024-01-25 ENCOUNTER — APPOINTMENT (OUTPATIENT)
Dept: PHYSICAL THERAPY | Facility: CLINIC | Age: 49
End: 2024-01-25
Payer: COMMERCIAL

## 2024-01-26 ENCOUNTER — TREATMENT (OUTPATIENT)
Dept: PHYSICAL THERAPY | Facility: CLINIC | Age: 49
End: 2024-01-26
Payer: COMMERCIAL

## 2024-01-26 DIAGNOSIS — M62.81 MUSCLE WEAKNESS (GENERALIZED): ICD-10-CM

## 2024-01-26 DIAGNOSIS — G89.29 CHRONIC RIGHT-SIDED LOW BACK PAIN WITHOUT SCIATICA: ICD-10-CM

## 2024-01-26 DIAGNOSIS — M50.221 OTHER CERVICAL DISC DISPLACEMENT AT C4-C5 LEVEL: Primary | ICD-10-CM

## 2024-01-26 DIAGNOSIS — M54.50 CHRONIC RIGHT-SIDED LOW BACK PAIN WITHOUT SCIATICA: ICD-10-CM

## 2024-01-26 DIAGNOSIS — M54.2 NECK PAIN: ICD-10-CM

## 2024-01-26 PROCEDURE — 97140 MANUAL THERAPY 1/> REGIONS: CPT | Mod: GP | Performed by: PHYSICAL THERAPIST

## 2024-01-26 NOTE — PROGRESS NOTES
"Physical Therapy  Physical Therapy Treatment    Patient Name: Justin Hanna  MRN: [id number]  Today's Date: 1/26/2024   Visit #5 (of 18 by 2/28/24)  Time in: 0900  Time out: 0945    Assessment:   Pt. tolerates treatment well, reporting significantly less overall pain and somewhat improved HA upon departure. Will look more closely at sub occipital area and associated motions, musculature, etc., in visits to come.     Plan:   Pt. returns next week.    Current Problem  1. Other cervical disc displacement at C4-C5 level        2. Chronic right-sided low back pain without sciatica        3. Muscle weakness (generalized)        4. Neck pain          Subjective    Pt. reports that his back felt, \"really good, actually\" upon departing last visit. He tweaked it again while dismounting the elliptical yesterday- not quite to the extent that it was painful beforehand- but that's still causing moderate pain/tightness this morning. He reports a current 4/10 HA about the top/sides of the head (B)- no significant neck pain at this moment.    Objective   Treatments:  Manual Therapy (46147): 45 minutes  Mechano pressure sensitivity tested about L sub occipitals. All somewhat tender, but obliquus capitis inferior seems to improve HA some, briefly, afterwards.   Palpatory assessment performed.   Dry needling provided to L OCI and middle scalene in R side lying and to R L2-3 paraspinals and L upper glute max in prone, x2 each in series, with TENS added to elicit local twitch responses to dec resting tonus and associated discomfort.  Soft tissue mobilization provided to B lumbar erectors and paraspinals as well as B upper gluteals in prone, including petrissage, stripping, cross frictions, and MFR to tolerance to promote local blood flow and soft tissue extensibility.  "

## 2024-01-29 ENCOUNTER — TREATMENT (OUTPATIENT)
Dept: PHYSICAL THERAPY | Facility: CLINIC | Age: 49
End: 2024-01-29
Payer: COMMERCIAL

## 2024-01-29 DIAGNOSIS — M62.81 MUSCLE WEAKNESS (GENERALIZED): ICD-10-CM

## 2024-01-29 DIAGNOSIS — M50.221 OTHER CERVICAL DISC DISPLACEMENT AT C4-C5 LEVEL: Primary | ICD-10-CM

## 2024-01-29 DIAGNOSIS — G89.29 CHRONIC RIGHT-SIDED LOW BACK PAIN WITHOUT SCIATICA: ICD-10-CM

## 2024-01-29 DIAGNOSIS — M54.50 CHRONIC RIGHT-SIDED LOW BACK PAIN WITHOUT SCIATICA: ICD-10-CM

## 2024-01-29 DIAGNOSIS — M54.2 NECK PAIN: ICD-10-CM

## 2024-01-29 PROCEDURE — 97140 MANUAL THERAPY 1/> REGIONS: CPT | Mod: GP | Performed by: PHYSICAL THERAPIST

## 2024-01-29 PROCEDURE — 97530 THERAPEUTIC ACTIVITIES: CPT | Mod: GP | Performed by: PHYSICAL THERAPIST

## 2024-01-30 ENCOUNTER — APPOINTMENT (OUTPATIENT)
Dept: PHYSICAL THERAPY | Facility: CLINIC | Age: 49
End: 2024-01-30
Payer: COMMERCIAL

## 2024-01-30 NOTE — PROGRESS NOTES
"Physical Therapy  Physical Therapy Treatment    Patient Name: Justin Hanna  MRN: [id number]  Today's Date: 1/29/2024   Visit #6 (of 18 by 2/28/24)  Time in: 0900 Time out: 0945    Assessment:   Pt. tolerates manual treatment well. We'll come back to recheck L sided neck (lower and sub occipital area) tonus next week, as well as seeing how else we might or might not learn more about/provoke hand N/T.     Plan:   Pt. will return later this week.     Current Problem  1. Other cervical disc displacement at C4-C5 level        2. Chronic right-sided low back pain without sciatica        3. Muscle weakness (generalized)        4. Neck pain          Subjective    Pt. reports that his back feels, \"stiff as a surf board\" today and that he woke up as such. He didn't have as much pain over the weekend because he took a break from exercising and wasn't overly active, besides. He's still contending with B hands going numb when he uses the elliptical.     Objective   Treatments:  Manual Therapy (64170): 30 minutes  Palpatory assessment performed.   Dry needling provided to each side upper glute max and to medial erectors at L2-3 on each side in prone, x2 each in series, with TENS added to elicit local twitch responses to dec resting tonus and associated discomfort.  Soft tissue mobilization provided to each of these, afterwards, including petrissage, stripping, cross frictions, and MFR to tolerance to promote local blood flow and soft tissue extensibility.    Therapeutic Activities (00765): 15 minutes  Screened upper limb dural tension tests in sitting (radial, ulnar, and median nn's self tests with visual cues/demo). None positive. Pt. had some N/T in B hands from laying prone for manual treatment that slowly dissipates- not appreciably affected by any of these movements/positions.   Reviewed HEP- keeping the same.  "

## 2024-02-01 ENCOUNTER — APPOINTMENT (OUTPATIENT)
Dept: PHYSICAL THERAPY | Facility: CLINIC | Age: 49
End: 2024-02-01

## 2024-02-02 ENCOUNTER — TREATMENT (OUTPATIENT)
Dept: PHYSICAL THERAPY | Facility: CLINIC | Age: 49
End: 2024-02-02

## 2024-02-02 DIAGNOSIS — G89.29 CHRONIC RIGHT-SIDED LOW BACK PAIN WITHOUT SCIATICA: ICD-10-CM

## 2024-02-02 DIAGNOSIS — M54.2 NECK PAIN: ICD-10-CM

## 2024-02-02 DIAGNOSIS — M54.50 CHRONIC RIGHT-SIDED LOW BACK PAIN WITHOUT SCIATICA: ICD-10-CM

## 2024-02-02 DIAGNOSIS — M50.221 OTHER CERVICAL DISC DISPLACEMENT AT C4-C5 LEVEL: Primary | ICD-10-CM

## 2024-02-02 DIAGNOSIS — M62.81 MUSCLE WEAKNESS (GENERALIZED): ICD-10-CM

## 2024-02-02 PROCEDURE — 97140 MANUAL THERAPY 1/> REGIONS: CPT | Mod: GP | Performed by: PHYSICAL THERAPIST

## 2024-02-02 NOTE — PROGRESS NOTES
Physical Therapy  Physical Therapy Treatment    Patient Name: Justin Hanna  MRN: [id number]  Today's Date: 2/2/2024   Visit #7 (of 18 by 2/28/24)  Time in: 0920  Time out: 0925    Assessment:   Pt. tolerates treatment well. Advised that he be on the look out for whether he feels a bit off kilter with less tonus in the sub cranial mm's.     Plan:   Will follow up on Monday.    Current Problem  1. Other cervical disc displacement at C4-C5 level        2. Chronic right-sided low back pain without sciatica        3. Muscle weakness (generalized)        4. Neck pain          Subjective    Pt. reports that his back and neck are both tight, today. He's going to see his physician later today. He saw the other earlier this week and has re submitted for further acupuncture, massage, etc. (those services and ours are on slightly different time frames).     This session is abbreviated because our  staff somehow didn't notice Justin coming in today and he sat/waited for longer than he should have. He is very small, and known for his stealth, so it's understandable.     Objective   Treatments:  Manual Therapy (45864): 25 minutes  Palpatory assessment performed.   Screened gross cervical rotation with manual hold of shoulders in place to limit thoracic movement- pt. has tightness L near sub occipital area when turning R and pain mid neck L when turning L.  Dry needling provided to L OCS and rectus capitis posterior major in prone, x2 each in series, with TENS added to elicit local twitch responses to dec resting tonus and associated discomfort.  We'll visit L levator scapula next visit.

## 2024-02-05 ENCOUNTER — TREATMENT (OUTPATIENT)
Dept: PHYSICAL THERAPY | Facility: CLINIC | Age: 49
End: 2024-02-05

## 2024-02-05 DIAGNOSIS — M62.81 MUSCLE WEAKNESS (GENERALIZED): ICD-10-CM

## 2024-02-05 DIAGNOSIS — G89.29 CHRONIC RIGHT-SIDED LOW BACK PAIN WITHOUT SCIATICA: ICD-10-CM

## 2024-02-05 DIAGNOSIS — M54.2 NECK PAIN: ICD-10-CM

## 2024-02-05 DIAGNOSIS — M50.221 OTHER CERVICAL DISC DISPLACEMENT AT C4-C5 LEVEL: Primary | ICD-10-CM

## 2024-02-05 DIAGNOSIS — M54.50 CHRONIC RIGHT-SIDED LOW BACK PAIN WITHOUT SCIATICA: ICD-10-CM

## 2024-02-05 PROCEDURE — 97140 MANUAL THERAPY 1/> REGIONS: CPT | Mod: GP | Performed by: PHYSICAL THERAPIST

## 2024-02-05 NOTE — PROGRESS NOTES
"Physical Therapy  Physical Therapy Treatment    Patient Name: Justin Hanna  MRN: [id number]  Today's Date: 2/5/2024   Visit #8 of 18 by 2/28/24  Time in: 0905  Time out: 0945    Assessment:   Pt. tolerates manual treatment well. I'm asking that he start working on a self mobilization of the cervicothoracic junction. We may consider thrust mobilization later.    Plan:   Pt. returns later this week. We'll check on his lower back/gluteal situation at that visit, as well.    Current Problem  1. Other cervical disc displacement at C4-C5 level        2. Chronic right-sided low back pain without sciatica        3. Muscle weakness (generalized)        4. Neck pain          Subjective    Pt. reports that his neck feels stiff on the L side today, and that his back feels, \"not too bad\". He had a quiet weekend and I'm his only appointment today.    Objective   Treatments:  Manual Therapy (59242): 40 minutes  Palpatory assessment performed.   Dry needling provided to L levator scapula and OCS in R side lying, x2 each in series, with TENS added to elicit local twitch responses to dec resting tonus and associated discomfort.  Soft tissue mobilization provided to L scapular elevators and lateral cervical musculature, including petrissage, stripping, cross frictions, and MFR to tolerance to promote local blood flow and soft tissue extensibility.  Screened upper cervical rotation test- 25% limited L- pulling pain reported in mid to lower neck L. WNL R  Cervical rotation + lateral flexion test- very stiff right away L, WNL R. No change with manual L first rib depression mobilization.  Supine P-A mobilizations, grade 3, x5 each C4-T3. Stiffness noted at C7-T1.   Wall anchored, towel roll at T1 cervical retractions 2x10. Verbal cue x1 to avoid extension. Pt. denies pain. Asking that he add this to HEP- to be done first thing in the morning.  "

## 2024-02-06 ENCOUNTER — APPOINTMENT (OUTPATIENT)
Dept: PHYSICAL THERAPY | Facility: CLINIC | Age: 49
End: 2024-02-06

## 2024-02-09 ENCOUNTER — TREATMENT (OUTPATIENT)
Dept: PHYSICAL THERAPY | Facility: CLINIC | Age: 49
End: 2024-02-09

## 2024-02-09 DIAGNOSIS — M62.81 MUSCLE WEAKNESS (GENERALIZED): ICD-10-CM

## 2024-02-09 DIAGNOSIS — M54.50 CHRONIC RIGHT-SIDED LOW BACK PAIN WITHOUT SCIATICA: ICD-10-CM

## 2024-02-09 DIAGNOSIS — G89.29 CHRONIC RIGHT-SIDED LOW BACK PAIN WITHOUT SCIATICA: ICD-10-CM

## 2024-02-09 DIAGNOSIS — M54.2 NECK PAIN: ICD-10-CM

## 2024-02-09 DIAGNOSIS — M50.221 OTHER CERVICAL DISC DISPLACEMENT AT C4-C5 LEVEL: Primary | ICD-10-CM

## 2024-02-09 PROCEDURE — 97140 MANUAL THERAPY 1/> REGIONS: CPT | Mod: GP | Performed by: PHYSICAL THERAPIST

## 2024-02-09 NOTE — PROGRESS NOTES
"Physical Therapy  Physical Therapy Treatment    Patient Name: Justin Hanna  MRN: [id number]  Today's Date: 2/9/2024   Visit #9 (of 18 by 2/28/24)  Time in: 0900  Time out: 0945    Assessment:   Pt. tolerates treatment well. Sub occipital area felt a lot better re: tonus, tenderness, etc., today. Will revisit home programming, C7-T1 mobility, and glute strengthening progressions next visit.    Plan:   Pt. to follow up early this coming week.    Current Problem  1. Other cervical disc displacement at C4-C5 level        2. Chronic right-sided low back pain without sciatica        3. Muscle weakness (generalized)        4. Neck pain          Subjective    Pt. reports that his L upper neck/skull area was very sore for ~2d after last visit. His neck feels tight, but not overly painful this morning, and his back is, \"decent\".    Objective   Treatments:  Manual Therapy (96140): 45 minutes  Palpatory assessment performed.   Dry needling provided to L upper trapezius and levator scapula in supine, and then to B upper glute max in prone, x2 each in series, with TENS added to elicit local twitch responses to dec resting tonus and associated discomfort.  Soft tissue mobilization provided to B lumbar erectors and paraspinals in prone, including petrissage, stripping, cross frictions, and MFR to tolerance to promote local blood flow and soft tissue extensibility.  No HEP changes made today.  "

## 2024-02-12 ENCOUNTER — TREATMENT (OUTPATIENT)
Dept: PHYSICAL THERAPY | Facility: CLINIC | Age: 49
End: 2024-02-12

## 2024-02-12 DIAGNOSIS — M62.81 MUSCLE WEAKNESS (GENERALIZED): ICD-10-CM

## 2024-02-12 DIAGNOSIS — M54.50 CHRONIC RIGHT-SIDED LOW BACK PAIN WITHOUT SCIATICA: ICD-10-CM

## 2024-02-12 DIAGNOSIS — G89.29 CHRONIC RIGHT-SIDED LOW BACK PAIN WITHOUT SCIATICA: ICD-10-CM

## 2024-02-12 DIAGNOSIS — M54.2 NECK PAIN: ICD-10-CM

## 2024-02-12 DIAGNOSIS — M50.221 OTHER CERVICAL DISC DISPLACEMENT AT C4-C5 LEVEL: Primary | ICD-10-CM

## 2024-02-12 PROCEDURE — 97140 MANUAL THERAPY 1/> REGIONS: CPT | Mod: GP | Performed by: PHYSICAL THERAPIST

## 2024-02-12 NOTE — PROGRESS NOTES
Physical Therapy  Physical Therapy Treatment    Patient Name: Justin Hanna  MRN: [id number]  Today's Date: 2/12/2024   Visit #10 (of 18 by 2/28/24)  Time in: 0900  Time out: 0940    Assessment:   Pt. tolerates treatment well. Asking that he make note of whether retraction, driving, etc., feel better/different between now and next visit.     Plan:   Pt. to follow up later this week.    Current Problem  1. Other cervical disc displacement at C4-C5 level        2. Chronic right-sided low back pain without sciatica        3. Muscle weakness (generalized)        4. Neck pain          Subjective    Pt. reports that his neck and back both feel very stiff, today. He was noticing some R sided neck pain with R sided movement, but that seemed to go away. The cervical retractions I have him doing after painful and reportedly not improving over time.    Objective   Treatments:  Manual Therapy (52892): 40 minutes  Cervical rotation + lateral flexion test performed. WNL R, 50% limited range, painful, L.  Prone cervicothoracic junction mobilization from swimmer position, grade 5 x1 R to L, x1 cavitation, x2 L to R, x1 cavitation on second attempt. Keeping cervical retractions vs. wall as-is/in place.   Palpatory assessment performed.   Dry needling provided to B lumbar erectors at L2-3 (lateral) in prone, x2 each in series, with TENS added to elicit local twitch responses to dec resting tonus and associated discomfort.  Soft tissue mobilization provided to the same, including petrissage, stripping, cross frictions, and MFR to tolerance to promote local blood flow and soft tissue extensibility.

## 2024-02-13 ENCOUNTER — APPOINTMENT (OUTPATIENT)
Dept: PHYSICAL THERAPY | Facility: CLINIC | Age: 49
End: 2024-02-13

## 2024-02-16 ENCOUNTER — TREATMENT (OUTPATIENT)
Dept: PHYSICAL THERAPY | Facility: CLINIC | Age: 49
End: 2024-02-16

## 2024-02-16 DIAGNOSIS — M54.2 NECK PAIN: ICD-10-CM

## 2024-02-16 DIAGNOSIS — M62.81 MUSCLE WEAKNESS (GENERALIZED): ICD-10-CM

## 2024-02-16 DIAGNOSIS — M54.50 CHRONIC RIGHT-SIDED LOW BACK PAIN WITHOUT SCIATICA: ICD-10-CM

## 2024-02-16 DIAGNOSIS — M50.221 OTHER CERVICAL DISC DISPLACEMENT AT C4-C5 LEVEL: Primary | ICD-10-CM

## 2024-02-16 DIAGNOSIS — G89.29 CHRONIC RIGHT-SIDED LOW BACK PAIN WITHOUT SCIATICA: ICD-10-CM

## 2024-02-16 PROCEDURE — 97140 MANUAL THERAPY 1/> REGIONS: CPT | Mod: GP | Performed by: PHYSICAL THERAPIST

## 2024-02-16 NOTE — PROGRESS NOTES
"Physical Therapy  Physical Therapy Treatment    Patient Name: Justin Hanna  MRN: [id number]  Today's Date: 2/16/2024   Visit #11 (of 18 by 2/28/24)  Time in: 0900  Time out: 0945    Assessment:   Pt. tolerates treatment well. He reports that his neck felt looser and he felt, \"less crushed\" in the cervicothoracic area after last visit, and that passive mobility seems to have stayed \"better\" into the latter part of the week.     Plan:   Pt. to follow up next week. Will recheck side bending ROM, QL tonus, glute med strength, etc.    Current Problem  1. Other cervical disc displacement at C4-C5 level        2. Chronic right-sided low back pain without sciatica        3. Muscle weakness (generalized)        4. Neck pain          Subjective    Pt. reports that he feels, \"stiff\" in general in both the neck and lower back. His new elliptical is more upright than the last one he had, and provides a greater challenge (more resistance in general)- but using it is going well otherwise. No significant plans for the weekend.    Objective   Treatments:  Manual Therapy (08025): 45 minutes  Cervical rotation + lateral flexion test WNL B sides/directions  Palpatory assessment performed.  Dry needling provided to B upper glute max, B thoracolumbar junction lateral erectors, and L levator scapula in prone and R side lying, respectively, x2 each in series, with TENS added to elicit local twitch responses to dec resting tonus and associated discomfort.  Soft tissue mobilization provided to lumbar erectors and paraspinals in prone, including petrissage, stripping, cross frictions, and MFR to tolerance to promote local blood flow and soft tissue extensibility.  "

## 2024-02-19 ENCOUNTER — TREATMENT (OUTPATIENT)
Dept: PHYSICAL THERAPY | Facility: CLINIC | Age: 49
End: 2024-02-19

## 2024-02-19 DIAGNOSIS — G89.29 CHRONIC RIGHT-SIDED LOW BACK PAIN WITHOUT SCIATICA: ICD-10-CM

## 2024-02-19 DIAGNOSIS — M50.221 OTHER CERVICAL DISC DISPLACEMENT AT C4-C5 LEVEL: Primary | ICD-10-CM

## 2024-02-19 DIAGNOSIS — M54.50 CHRONIC RIGHT-SIDED LOW BACK PAIN WITHOUT SCIATICA: ICD-10-CM

## 2024-02-19 DIAGNOSIS — M54.2 NECK PAIN: ICD-10-CM

## 2024-02-19 DIAGNOSIS — M62.81 MUSCLE WEAKNESS (GENERALIZED): ICD-10-CM

## 2024-02-19 PROCEDURE — 97140 MANUAL THERAPY 1/> REGIONS: CPT | Mod: GP | Performed by: PHYSICAL THERAPIST

## 2024-02-19 NOTE — PROGRESS NOTES
Physical Therapy  Physical Therapy Treatment    Patient Name: Justin Hanna  MRN: [id number]  Today's Date: 2/19/2024   Visit #12 (of 18 by 2/28/24)  Time in: 0845  Time out: 0925    Assessment:   Pt. responding well to treatment. It seems that there is carryover lessening of tension in mms we work on from one visit to the next, overall. We'll consider adding more specific glute max work next visit if pt. doesn't feel sufficient work happening there with the elliptical.     Plan:   Pt. returns later this week.     Current Problem  1. Other cervical disc displacement at C4-C5 level        2. Chronic right-sided low back pain without sciatica        3. Muscle weakness (generalized)        4. Neck pain          Subjective    Pt. reports that he was sick for most of the weekend and remains a bit generally sore today, but he's on the up swing. He reports that he's hydrating a lot, proactively, dt dryness being a side effect of one of his medications. He has a couple of appointments today and a couple tomorrow.     Objective   Treatments:  Manual Therapy (21968): 40 minutes  Palpatory assessment performed.  Dry needling provided to B SCMs (above split) in supine and B upper glute max in prone, x2 each in series, with TENS added to elicit local twitch responses to dec resting tonus and associated discomfort.  Soft tissue mobilization provided to B SCMs and scalenes in supine, including petrissage, stripping, cross frictions, and MFR to tolerance to promote local blood flow and soft tissue extensibility.  Reviewed HEP verbally- pt. to make a mental note of whether he feels good glute max activation on his new elliptical (as-is or with inc'd cross ramp) and to report back later this week. He verbalizes understanding.

## 2024-02-20 ENCOUNTER — APPOINTMENT (OUTPATIENT)
Dept: PHYSICAL THERAPY | Facility: CLINIC | Age: 49
End: 2024-02-20

## 2024-02-23 ENCOUNTER — TREATMENT (OUTPATIENT)
Dept: PHYSICAL THERAPY | Facility: CLINIC | Age: 49
End: 2024-02-23

## 2024-02-23 DIAGNOSIS — M62.81 MUSCLE WEAKNESS (GENERALIZED): ICD-10-CM

## 2024-02-23 DIAGNOSIS — M50.221 OTHER CERVICAL DISC DISPLACEMENT AT C4-C5 LEVEL: Primary | ICD-10-CM

## 2024-02-23 DIAGNOSIS — M54.50 CHRONIC RIGHT-SIDED LOW BACK PAIN WITHOUT SCIATICA: ICD-10-CM

## 2024-02-23 DIAGNOSIS — M54.2 NECK PAIN: ICD-10-CM

## 2024-02-23 DIAGNOSIS — G89.29 CHRONIC RIGHT-SIDED LOW BACK PAIN WITHOUT SCIATICA: ICD-10-CM

## 2024-02-23 PROCEDURE — 97140 MANUAL THERAPY 1/> REGIONS: CPT | Mod: GP | Performed by: PHYSICAL THERAPIST

## 2024-02-23 NOTE — PROGRESS NOTES
"Physical Therapy  Physical Therapy Treatment    Patient Name: Justin Hanna  MRN: [id number]  Today's Date: 2/23/2024   Visit #13 (of 18 by 2/28/24)  Time in: 0900  Time out: 0940    Assessment:   Pt. tolerates treatment well. We'll review HEP and make updates, etc., assuming he's feeling back to normal health-wise at next visit.    Plan:   Pt. in twice next week- we'll need to recheck goals, ROM, etc., to submit for a date extension during the second visit- planning to then follow up 1/week for ~3w thereafter.    Current Problem  1. Other cervical disc displacement at C4-C5 level        2. Chronic right-sided low back pain without sciatica        3. Muscle weakness (generalized)        4. Neck pain          Subjective    Pt. reports that he's getting over an illness (still congested, etc.), and that has caused inc'd familiar symptoms (back pain, neck stiffness, etc.) but nothing altogether \"new\" or different besides that.     Objective   Treatments:  Manual Therapy (05073): 40 minutes  Screened cervical AROM.  Palpatory assessment performed.  Dry needling provided to R UT and then L UT and posterior scalane in prone, x2 each in series, with TENS added to elicit local twitch responses to dec resting tonus and associated discomfort.  Soft tissue mobilization provided to each of these, including petrissage, stripping, cross frictions, and MFR to tolerance to promote local blood flow and soft tissue extensibility.  Prone P-A springing, grade 3, x5 each: T6-L2  Prone P-A mobilization with exhale, grade 5 x1, T12 on L1, no cavitation.  "

## 2024-02-26 ENCOUNTER — TREATMENT (OUTPATIENT)
Dept: PHYSICAL THERAPY | Facility: CLINIC | Age: 49
End: 2024-02-26

## 2024-02-26 DIAGNOSIS — M50.221 OTHER CERVICAL DISC DISPLACEMENT AT C4-C5 LEVEL: Primary | ICD-10-CM

## 2024-02-26 DIAGNOSIS — M62.81 MUSCLE WEAKNESS (GENERALIZED): ICD-10-CM

## 2024-02-26 DIAGNOSIS — G89.29 CHRONIC RIGHT-SIDED LOW BACK PAIN WITHOUT SCIATICA: ICD-10-CM

## 2024-02-26 DIAGNOSIS — M54.2 NECK PAIN: ICD-10-CM

## 2024-02-26 DIAGNOSIS — M54.50 CHRONIC RIGHT-SIDED LOW BACK PAIN WITHOUT SCIATICA: ICD-10-CM

## 2024-02-26 PROCEDURE — 97140 MANUAL THERAPY 1/> REGIONS: CPT | Mod: GP | Performed by: PHYSICAL THERAPIST

## 2024-02-26 NOTE — PROGRESS NOTES
Physical Therapy  Physical Therapy Treatment    Patient Name: Justin Hanna  MRN: [id number]  Today's Date: 2/26/2024   Visit #14 (of 18 by 2/28/24)  Time in: 0900  Time out: 0940    Assessment:   Pt. tolerates treatment well- reporting feeling looser in the back and hips after treatment. Issued the NDI and BLAS for pt. to complete before returning, so we can recheck goals, etc., and request a date extension next visit.    Plan:   Pt. returns Wednesday.    Current Problem  1. Other cervical disc displacement at C4-C5 level        2. Chronic right-sided low back pain without sciatica        3. Muscle weakness (generalized)        4. Neck pain          Subjective    Pt. reports that he's back to full health re: congestion, breathing, coughing, etc. He reports that his back and neck pain/stiffness have gradually improved over time during this episode. He has also started stretching his calves often per the recommendation of his chiropractor (sitting in a deeper, functional squat position for several minutes once per day).     Objective   Treatments:  Manual Therapy (38899): 40 minutes  Palpatory assessment performed.   Dry needling provided to B L3-4 medial erectors, L lateral T11-12 erectors, and B upper glute max near sacrum in prone, x2 each in series, with TENS added to elicit local twitch responses to dec resting tonus and associated discomfort.  Soft tissue mobilization provided to B lumbosacral erectors and paraspinals in prone, including petrissage, stripping, cross frictions, and MFR to tolerance to promote local blood flow and soft tissue extensibility.

## 2024-02-28 ENCOUNTER — TREATMENT (OUTPATIENT)
Dept: PHYSICAL THERAPY | Facility: CLINIC | Age: 49
End: 2024-02-28

## 2024-02-28 DIAGNOSIS — M54.2 NECK PAIN: ICD-10-CM

## 2024-02-28 DIAGNOSIS — M50.221 OTHER CERVICAL DISC DISPLACEMENT AT C4-C5 LEVEL: Primary | ICD-10-CM

## 2024-02-28 DIAGNOSIS — M54.50 CHRONIC RIGHT-SIDED LOW BACK PAIN WITHOUT SCIATICA: ICD-10-CM

## 2024-02-28 DIAGNOSIS — G89.29 CHRONIC RIGHT-SIDED LOW BACK PAIN WITHOUT SCIATICA: ICD-10-CM

## 2024-02-28 DIAGNOSIS — M62.81 MUSCLE WEAKNESS (GENERALIZED): ICD-10-CM

## 2024-02-28 PROCEDURE — 97140 MANUAL THERAPY 1/> REGIONS: CPT | Mod: GP | Performed by: PHYSICAL THERAPIST

## 2024-02-28 PROCEDURE — 97110 THERAPEUTIC EXERCISES: CPT | Mod: GP | Performed by: PHYSICAL THERAPIST

## 2024-02-28 NOTE — PROGRESS NOTES
"Physical Therapy  Physical Therapy Treatment    Patient Name: Justin Hanna  MRN: [id number]  Today's Date: 2/28/2024   Visit #15 (of 18 by 2/28/24)  Time in: 0900  Time out: 0945    Assessment:   Pt. tolerates treatment well. He reports gradual improvement in both back and neck pain as we've continued working together. Updated ROM measurements, outcome measure scores, etc., in the service of seeking a date extension.    Plan:   Pt. scheduled 1x/week for the last 3 currently authorized visits, if we get out date extension and can use them as such.    Current Problem  1. Other cervical disc displacement at C4-C5 level        2. Chronic right-sided low back pain without sciatica        3. Muscle weakness (generalized)        4. Neck pain          Subjective    Pt. reports that his back is feeling okay today and his neck feels stiff. He slept poorly last night. To that end- he had a sleep study lately and woke up >140x dt apnea- he anticipates a cPAP machine in his near future. No issue with current exercises. Pt. has speech therapy later today.    Objective   Treatments:  Therapeutic Exercise (03143): 30 minutes  Checked AROM cervical rotation: 43 deg. L, 45 R. No pain. Side bending 20 deg. L, 24 R. Pt. reports feeling R sided discomfort at end range R, \"just stretching\" L.  Flexion and extension both WNL. R SB NE with manual R first rib depression. Range improves greatly after manual treatment.  Standing lumbar flexion- fingertips to mid shin. Feels good in flexed position- pt. reports return of the \"crushing\" feeling he gets upon return to upright standing. Extension WFL (good range, no pain).   Standing crunches vs. weight stack 25#, 2x10. Added for home. Reviewed other stretches, elliptical, etc.- keeping HEP otherwise the same.     BLAS score: 36/50, 72% impaired  QuickDASH score: 32 (raw), 64% impaired    Manual Therapy (34375): 15 minutes  Palaptory assessment performed.   Dry needling provided to R middle scalene " and upper trapezius in supine, x2 each in series, with TENS added to elicit local twitch responses to dec resting tonus and associated discomfort.  Soft tissue mobilization provided to the same, including petrissage, stripping, cross frictions, and MFR to tolerance to promote local blood flow and soft tissue extensibility.

## 2024-03-06 ENCOUNTER — APPOINTMENT (OUTPATIENT)
Dept: PHYSICAL THERAPY | Facility: CLINIC | Age: 49
End: 2024-03-06

## 2024-03-11 ENCOUNTER — APPOINTMENT (OUTPATIENT)
Dept: PRIMARY CARE | Facility: CLINIC | Age: 49
End: 2024-03-11

## 2024-03-13 ENCOUNTER — TREATMENT (OUTPATIENT)
Dept: PHYSICAL THERAPY | Facility: CLINIC | Age: 49
End: 2024-03-13
Payer: COMMERCIAL

## 2024-03-13 DIAGNOSIS — M50.221 OTHER CERVICAL DISC DISPLACEMENT AT C4-C5 LEVEL: Primary | ICD-10-CM

## 2024-03-13 DIAGNOSIS — M62.81 MUSCLE WEAKNESS (GENERALIZED): ICD-10-CM

## 2024-03-13 DIAGNOSIS — M54.2 NECK PAIN: ICD-10-CM

## 2024-03-13 DIAGNOSIS — M54.50 CHRONIC RIGHT-SIDED LOW BACK PAIN WITHOUT SCIATICA: ICD-10-CM

## 2024-03-13 DIAGNOSIS — G89.29 CHRONIC RIGHT-SIDED LOW BACK PAIN WITHOUT SCIATICA: ICD-10-CM

## 2024-03-13 PROCEDURE — 97140 MANUAL THERAPY 1/> REGIONS: CPT | Mod: GP | Performed by: PHYSICAL THERAPIST

## 2024-03-13 PROCEDURE — 97110 THERAPEUTIC EXERCISES: CPT | Mod: GP | Performed by: PHYSICAL THERAPIST

## 2024-03-14 NOTE — PROGRESS NOTES
Physical Therapy  Physical Therapy Treatment    Patient Name: Justin Hanna  MRN: [id number]  Today's Date: 3/13/2024   Visit #16 (of 18 by 3/31/24)  Time in: 1745  Time out: 1830    Assessment:   Pt. is doing well re: back pain, today, so we focused on his neck. Needling seems to reduce m. tonus and tenderness in the lower cervical area, and I'm going to have hip start working on wall walks for postural purposes.     Plan:   Will follow up next week.    Current Problem  1. Other cervical disc displacement at C4-C5 level        2. Neck pain        3. Muscle weakness (generalized)        4. Chronic right-sided low back pain without sciatica          Subjective    Pt. reports that he has been sleeping on his couch a lot because he got a new puppy, and that his lower back pain has been very mild for some reason- minimal at rest, still some L LBP when returning from flexed/forward bent positions.    His L lower neck has tightened back up, a bit, and hurts more with leftward motion. He reports that it's somewhere between the best and worst its been re: intensity. No radiating arm symptoms- some into the R upper scapular area.     Objective   Treatments:  Manual Therapy (86652): 30 minutes  Side glides screened, grade 3 x5 each side/level, C2-3 through 6-7. Slight stiffness noted L C5-6 and 6-7, but not provocative with this motion.  Palpatory assessment performed.   Dry needling provided to R anterior scalene in supine and then to L C5-6 paraspinals and upper trapezius in R side lying, x2 each in series, with TENS added to elicit local twitch responses to dec resting tonus and associated discomfort.  Soft tissue mobilization provided to the same, including petrissage, stripping, cross frictions, and MFR to tolerance to promote local blood flow and soft tissue extensibility.    Therapeutic Exercise (15500): 15 minutes  Reviewed chin tucks- keeping in place.  Wall walks vs. green t band x10. Pt. reports fatigue in each UE but  no pain. Added for home- band issued.   Discussed elliptical work and cardiovascular situation- no recent changes. Pt. reports further weight loss of a few more pounds which he thinks his helping.   Discussed sleep study and movement (or lack of) on a cPAP- he has an appointment in May.   No further HEP changes made today.

## 2024-03-18 ENCOUNTER — TREATMENT (OUTPATIENT)
Dept: PHYSICAL THERAPY | Facility: CLINIC | Age: 49
End: 2024-03-18
Payer: COMMERCIAL

## 2024-03-18 DIAGNOSIS — M62.81 MUSCLE WEAKNESS (GENERALIZED): ICD-10-CM

## 2024-03-18 DIAGNOSIS — M54.2 NECK PAIN: ICD-10-CM

## 2024-03-18 DIAGNOSIS — M50.221 OTHER CERVICAL DISC DISPLACEMENT AT C4-C5 LEVEL: Primary | ICD-10-CM

## 2024-03-18 DIAGNOSIS — G89.29 CHRONIC RIGHT-SIDED LOW BACK PAIN WITHOUT SCIATICA: ICD-10-CM

## 2024-03-18 DIAGNOSIS — M54.50 CHRONIC RIGHT-SIDED LOW BACK PAIN WITHOUT SCIATICA: ICD-10-CM

## 2024-03-18 PROCEDURE — 97140 MANUAL THERAPY 1/> REGIONS: CPT | Mod: GP | Performed by: PHYSICAL THERAPIST

## 2024-03-18 NOTE — PROGRESS NOTES
"Physical Therapy  Physical Therapy Treatment    Patient Name: Justin Hanna  MRN: [id number]  Today's Date: 3/18/2024   Visit #17 (of 18 by 3/31/24)  Time in: 0930  Time out: 1010    Assessment:   Pt. tolerates treatment well- reporting better rightward head/neck motion especially. We have one more visit scheduled, so I told pt. to come with any questions, items to clarify, etc., at that point as history would dictate that there might be a lapse between then and whenever we get to continue working together (if/when).    Plan:   See above. Pt. returns next week.    Current Problem  1. Other cervical disc displacement at C4-C5 level        2. Chronic right-sided low back pain without sciatica        3. Muscle weakness (generalized)        4. Neck pain          Subjective    Pt. reports that he's having a lot of L sided neck tightness up near the skull today- worse with R rotation. His back pain returned- he thinks that the relief he reported last week was, \"fool's gold\"- as it has tightened back up in a familiar way (no incident or specific mechanism).    Objective   Treatments:  Manual Therapy (54831): 40 minutes  Tested R SB and rotation with L arm at rest and overhead- no difference in tightness sensation.  Palpatory assessment performed.   Dry needling provided to L obliquus capitis superior and rectus capitis posterior major and to B thoracolumbar longissimus and B gluteus johanna lower fibers near sacrum in prone, x2 each in series, with TENS added to elicit local twitch responses to dec resting tonus and associated discomfort.  Soft tissue mobilization provided to B thoracolumbar paraspinals and erectors in prone, including petrissage, stripping, cross frictions, and MFR to tolerance to promote local blood flow and soft tissue extensibility.  "

## 2024-03-19 ENCOUNTER — OFFICE VISIT (OUTPATIENT)
Dept: PRIMARY CARE | Facility: CLINIC | Age: 49
End: 2024-03-19

## 2024-03-19 VITALS
TEMPERATURE: 97.6 F | DIASTOLIC BLOOD PRESSURE: 76 MMHG | BODY MASS INDEX: 28.98 KG/M2 | HEIGHT: 76 IN | WEIGHT: 238 LBS | HEART RATE: 74 BPM | SYSTOLIC BLOOD PRESSURE: 140 MMHG | RESPIRATION RATE: 16 BRPM | OXYGEN SATURATION: 96 %

## 2024-03-19 DIAGNOSIS — I10 PRIMARY HYPERTENSION: Primary | ICD-10-CM

## 2024-03-19 DIAGNOSIS — I27.20 PULMONARY HYPERTENSION (MULTI): ICD-10-CM

## 2024-03-19 DIAGNOSIS — I51.7 RIGHT VENTRICULAR HYPERTROPHY: ICD-10-CM

## 2024-03-19 DIAGNOSIS — J32.0 SINUSITIS, MAXILLARY, CHRONIC: ICD-10-CM

## 2024-03-19 PROCEDURE — 99212 OFFICE O/P EST SF 10 MIN: CPT | Performed by: FAMILY MEDICINE

## 2024-03-19 RX ORDER — ALBUTEROL SULFATE 90 UG/1
2 AEROSOL, METERED RESPIRATORY (INHALATION) EVERY 6 HOURS PRN
COMMUNITY
Start: 2024-01-08

## 2024-03-19 RX ORDER — MUPIROCIN 20 MG/G
OINTMENT TOPICAL NIGHTLY
Qty: 30 G | Refills: 1 | Status: SHIPPED | OUTPATIENT
Start: 2024-03-19 | End: 2024-03-29

## 2024-03-19 RX ORDER — AMLODIPINE BESYLATE 5 MG/1
5 TABLET ORAL DAILY
Qty: 30 TABLET | Refills: 2 | Status: SHIPPED | OUTPATIENT
Start: 2024-03-19 | End: 2024-04-19

## 2024-03-19 RX ORDER — LOSARTAN POTASSIUM AND HYDROCHLOROTHIAZIDE 25; 100 MG/1; MG/1
1 TABLET ORAL DAILY
COMMUNITY

## 2024-03-19 RX ORDER — PRAZOSIN HYDROCHLORIDE 5 MG/1
5 CAPSULE ORAL NIGHTLY
COMMUNITY
Start: 2024-02-13

## 2024-03-19 ASSESSMENT — ENCOUNTER SYMPTOMS
TROUBLE SWALLOWING: 0
FATIGUE: 0
ABDOMINAL DISTENTION: 0
SORE THROAT: 0
PHOTOPHOBIA: 0
SINUS PAIN: 0
ARTHRALGIAS: 0
SINUS PRESSURE: 0
ABDOMINAL PAIN: 0
CONSTITUTIONAL NEGATIVE: 1
APPETITE CHANGE: 0
CONSTIPATION: 0
FLANK PAIN: 0
SHORTNESS OF BREATH: 0
SLEEP DISTURBANCE: 0
COUGH: 0
ACTIVITY CHANGE: 0
CHEST TIGHTNESS: 0
DIZZINESS: 0
RHINORRHEA: 0
HEADACHES: 0
PALPITATIONS: 0
BLURRED VISION: 0
COLOR CHANGE: 0
SEIZURES: 0
SPEECH DIFFICULTY: 0
NERVOUS/ANXIOUS: 0
EYE PAIN: 0
POLYPHAGIA: 0
POLYDIPSIA: 0
MYALGIAS: 0
CONFUSION: 0
HEMATURIA: 0
DIARRHEA: 0
STRIDOR: 0
DECREASED CONCENTRATION: 0
NECK STIFFNESS: 0
AGITATION: 0
DYSPHORIC MOOD: 0
DYSURIA: 0
RECTAL PAIN: 0
HYPERTENSION: 1
BLOOD IN STOOL: 0
FEVER: 0
ADENOPATHY: 0

## 2024-03-19 NOTE — PATIENT INSTRUCTIONS
Follow up in 3 months    Continue current medications and therapy for chronic medical conditions.    Patient was advised importance of proper diet/nutrition in addition adequate hydration. Patient was encouraged moderate exercise program to include 30 minutes daily for 5 days of the week or 150 minutes weekly. Patient will follow-up with us as scheduled.    CT chest scheduled April 2024 at [facility] to check for emphysema     Start amlodipine 5 mg daily     Monitor blood pressure at home     Start Bactroban ointment intranasal

## 2024-03-19 NOTE — PROGRESS NOTES
"Subjective   Patient ID: Justin Hanna is a 48 y.o. male who presents for Hypertension.    Patient denies any symptoms or concerns today.    Hypertension  This is a recurrent problem. The current episode started more than 1 year ago. The problem is unchanged. Pertinent negatives include no blurred vision, chest pain, headaches, palpitations or shortness of breath. There are no associated agents to hypertension. There are no known risk factors for coronary artery disease.        Review of Systems   Constitutional: Negative.  Negative for activity change, appetite change, fatigue and fever.   HENT:  Negative for congestion, dental problem, ear discharge, ear pain, mouth sores, rhinorrhea, sinus pressure, sinus pain, sore throat, tinnitus and trouble swallowing.    Eyes:  Negative for blurred vision, photophobia, pain and visual disturbance.   Respiratory:  Negative for cough, chest tightness, shortness of breath and stridor.    Cardiovascular:  Negative for chest pain and palpitations.   Gastrointestinal:  Negative for abdominal distention, abdominal pain, blood in stool, constipation, diarrhea and rectal pain.   Endocrine: Negative for cold intolerance, heat intolerance, polydipsia, polyphagia and polyuria.   Genitourinary:  Negative for dysuria, flank pain, hematuria and urgency.   Musculoskeletal:  Negative for arthralgias, gait problem, myalgias and neck stiffness.   Skin:  Negative for color change and rash.   Allergic/Immunologic: Negative for environmental allergies and food allergies.   Neurological:  Negative for dizziness, seizures, syncope, speech difficulty and headaches.   Hematological:  Negative for adenopathy.   Psychiatric/Behavioral:  Negative for agitation, confusion, decreased concentration, dysphoric mood and sleep disturbance. The patient is not nervous/anxious.        Objective   /76   Pulse 74   Temp 36.4 °C (97.6 °F)   Resp 16   Ht 1.93 m (6' 4\")   Wt 108 kg (238 lb)   SpO2 96%   " BMI 28.97 kg/m²     Physical Exam  Vitals reviewed.   Constitutional:       General: He is not in acute distress.     Appearance: Normal appearance. He is normal weight. He is not ill-appearing or diaphoretic.   HENT:      Head: Normocephalic.      Right Ear: Tympanic membrane and external ear normal.      Left Ear: Tympanic membrane and external ear normal.      Nose: Nose normal. No congestion.      Mouth/Throat:      Pharynx: No posterior oropharyngeal erythema.   Eyes:      General:         Right eye: No discharge.         Left eye: No discharge.      Extraocular Movements: Extraocular movements intact.      Conjunctiva/sclera: Conjunctivae normal.      Pupils: Pupils are equal, round, and reactive to light.   Cardiovascular:      Rate and Rhythm: Normal rate and regular rhythm.      Pulses: Normal pulses.      Heart sounds: Normal heart sounds. No murmur heard.  Pulmonary:      Effort: Pulmonary effort is normal. No respiratory distress.      Breath sounds: Normal breath sounds. No wheezing or rales.   Chest:      Chest wall: No tenderness.   Abdominal:      General: Abdomen is flat. Bowel sounds are normal. There is no distension.      Palpations: There is no mass.      Tenderness: There is no abdominal tenderness. There is no guarding.   Musculoskeletal:         General: No tenderness. Normal range of motion.      Cervical back: Normal range of motion and neck supple. No tenderness.      Right lower leg: No edema.      Left lower leg: No edema.   Skin:     General: Skin is dry.      Coloration: Skin is not jaundiced.      Findings: No bruising, erythema or rash.   Neurological:      General: No focal deficit present.      Mental Status: He is alert and oriented to person, place, and time. Mental status is at baseline.      Cranial Nerves: No cranial nerve deficit.      Sensory: No sensory deficit.      Coordination: Coordination normal.      Gait: Gait normal.   Psychiatric:         Mood and Affect: Mood  normal.         Thought Content: Thought content normal.         Judgment: Judgment normal.         Assessment/Plan   Problem List Items Addressed This Visit             ICD-10-CM    Primary hypertension - Primary I10    Relevant Medications    amLODIPine (Norvasc) 5 mg tablet    Right ventricular hypertrophy I51.7    Relevant Medications    amLODIPine (Norvasc) 5 mg tablet    Pulmonary hypertension (CMS/HCC) I27.20    Relevant Orders    Follow Up In [facility] Pharm[facility]     Other Visit Diagnoses         Codes    Sinusitis, maxillary, chronic     J32.0    Relevant Medications    mupirocin (Bactroban) 2 % ointment          Scribe Attestation  By signing my name below, I, Elpidio Jean Baptiste DO , Scribe   attest that this documentation has been prepared under the direction and in the presence of Elpidio Jean Baptiste DO.    Provider Attestation - Scribe documentation    All medical record entries made by the Scribe were at my direction and personally dictated by me. I have reviewed the chart and agree that the record accurately reflects my personal performance of the history, physical exam, discussion and plan.

## 2024-03-27 ENCOUNTER — TREATMENT (OUTPATIENT)
Dept: PHYSICAL THERAPY | Facility: CLINIC | Age: 49
End: 2024-03-27
Payer: COMMERCIAL

## 2024-03-27 DIAGNOSIS — M50.221 OTHER CERVICAL DISC DISPLACEMENT AT C4-C5 LEVEL: Primary | ICD-10-CM

## 2024-03-27 DIAGNOSIS — M54.50 CHRONIC RIGHT-SIDED LOW BACK PAIN WITHOUT SCIATICA: ICD-10-CM

## 2024-03-27 DIAGNOSIS — G89.29 CHRONIC RIGHT-SIDED LOW BACK PAIN WITHOUT SCIATICA: ICD-10-CM

## 2024-03-27 DIAGNOSIS — M54.2 NECK PAIN: ICD-10-CM

## 2024-03-27 DIAGNOSIS — M62.81 MUSCLE WEAKNESS (GENERALIZED): ICD-10-CM

## 2024-03-27 PROCEDURE — 97140 MANUAL THERAPY 1/> REGIONS: CPT | Mod: GP | Performed by: PHYSICAL THERAPIST

## 2024-03-27 NOTE — PROGRESS NOTES
Physical Therapy  Physical Therapy Treatment    Patient Name: Justin Hanna  MRN: [id number]  Today's Date: 3/27/2024   Visit #18 (of 18 by 3/31/24)  Time in: 0915  Time out: 1000    Assessment:   Pt.'s gross neck ROM is doing okay. If working on the anterior scalenes seems to help for a day or two and then revert- pt. is to email me and I'll send him some additional stretches, etc, to work on. Otherwise, I advise continued HEP performance until he's able to return.    Plan:   Pt. to follow up when able. PT on hold, for now, until he gets further care authorized. This has taken significant time in the past- in the mean time, Justin is to email me if he has any questions, etc. about his ex's, progressions, etc.    Current Problem  1. Other cervical disc displacement at C4-C5 level        2. Neck pain        3. Muscle weakness (generalized)        4. Chronic right-sided low back pain without sciatica          Subjective    Pt. reports that his neck and back both feel stiff, today. He's training a new dog and waking up intermittently throughout the night for back yard trips, which hasn't helped, but he also hasn't had radicular pain or severe aggravations lately, either.    Objective   Treatments:  Manual Therapy (96398): 45 minutes  Palpatory assessment performed.   Dry needling provided to B L4-5 longissimus in prone and to each anterior scalene in supine, x2 each in series, with TENS added to elicit local twitch responses to dec resting tonus and associated discomfort.  Soft tissue mobilization provided to lumbosacral musculature in prone and then to L upper trapezius, levator, and scalenes in supine, including petrissage, stripping, cross frictions, and MFR to tolerance to promote local blood flow and soft tissue extensibility.  First rib depression mobility WFL B sides- screened in supine.  Cervical rotation + lateral flexion ~25% limited range, pinchy pain at end range of each contralaterally (hurts on the L when going  right and vice versa).   Prone cervicothoracic junction mobilizations, grade 5 x1 per side, swimmer position. x1 cavitation R to L, none L to R.  Reviewed HEP verbally.

## 2024-04-18 DIAGNOSIS — I10 PRIMARY HYPERTENSION: ICD-10-CM

## 2024-04-19 RX ORDER — AMLODIPINE BESYLATE 5 MG/1
5 TABLET ORAL DAILY
Qty: 90 TABLET | Refills: 1 | Status: SHIPPED | OUTPATIENT
Start: 2024-04-19 | End: 2024-05-28 | Stop reason: WASHOUT

## 2024-05-28 ENCOUNTER — OFFICE VISIT (OUTPATIENT)
Dept: PRIMARY CARE | Facility: CLINIC | Age: 49
End: 2024-05-28

## 2024-05-28 VITALS
TEMPERATURE: 97 F | HEART RATE: 81 BPM | WEIGHT: 227.4 LBS | HEIGHT: 76 IN | RESPIRATION RATE: 16 BRPM | BODY MASS INDEX: 27.69 KG/M2 | OXYGEN SATURATION: 96 % | SYSTOLIC BLOOD PRESSURE: 118 MMHG | DIASTOLIC BLOOD PRESSURE: 70 MMHG

## 2024-05-28 DIAGNOSIS — I10 PRIMARY HYPERTENSION: Primary | ICD-10-CM

## 2024-05-28 DIAGNOSIS — I27.20 PULMONARY HYPERTENSION (MULTI): ICD-10-CM

## 2024-05-28 DIAGNOSIS — F43.10 POSTTRAUMATIC STRESS DISORDER: ICD-10-CM

## 2024-05-28 PROCEDURE — 99212 OFFICE O/P EST SF 10 MIN: CPT | Performed by: FAMILY MEDICINE

## 2024-05-28 ASSESSMENT — PATIENT HEALTH QUESTIONNAIRE - PHQ9
3. TROUBLE FALLING OR STAYING ASLEEP OR SLEEPING TOO MUCH: NEARLY EVERY DAY
9. THOUGHTS THAT YOU WOULD BE BETTER OFF DEAD, OR OF HURTING YOURSELF: NOT AT ALL
SUM OF ALL RESPONSES TO PHQ QUESTIONS 1-9: 19
2. FEELING DOWN, DEPRESSED OR HOPELESS: MORE THAN HALF THE DAYS
2. FEELING DOWN, DEPRESSED OR HOPELESS: MORE THAN HALF THE DAYS
10. IF YOU CHECKED OFF ANY PROBLEMS, HOW DIFFICULT HAVE THESE PROBLEMS MADE IT FOR YOU TO DO YOUR WORK, TAKE CARE OF THINGS AT HOME, OR GET ALONG WITH OTHER PEOPLE: EXTREMELY DIFFICULT
5. POOR APPETITE OR OVEREATING: NEARLY EVERY DAY
1. LITTLE INTEREST OR PLEASURE IN DOING THINGS: NEARLY EVERY DAY
1. LITTLE INTEREST OR PLEASURE IN DOING THINGS: NEARLY EVERY DAY
5. POOR APPETITE OR OVEREATING: NEARLY EVERY DAY
4. FEELING TIRED OR HAVING LITTLE ENERGY: NEARLY EVERY DAY
6. FEELING BAD ABOUT YOURSELF - OR THAT YOU ARE A FAILURE OR HAVE LET YOURSELF OR YOUR FAMILY DOWN: SEVERAL DAYS
7. TROUBLE CONCENTRATING ON THINGS, SUCH AS READING THE NEWSPAPER OR WATCHING TELEVISION: NEARLY EVERY DAY
4. FEELING TIRED OR HAVING LITTLE ENERGY: NEARLY EVERY DAY
SUM OF ALL RESPONSES TO PHQ9 QUESTIONS 1 AND 2: 5
8. MOVING OR SPEAKING SO SLOWLY THAT OTHER PEOPLE COULD HAVE NOTICED. OR THE OPPOSITE, BEING SO FIGETY OR RESTLESS THAT YOU HAVE BEEN MOVING AROUND A LOT MORE THAN USUAL: SEVERAL DAYS
8. MOVING OR SPEAKING SO SLOWLY THAT OTHER PEOPLE COULD HAVE NOTICED. OR THE OPPOSITE, BEING SO FIGETY OR RESTLESS THAT YOU HAVE BEEN MOVING AROUND A LOT MORE THAN USUAL: NEARLY EVERY DAY
SUM OF ALL RESPONSES TO PHQ9 QUESTIONS 1 AND 2: 5
7. TROUBLE CONCENTRATING ON THINGS, SUCH AS READING THE NEWSPAPER OR WATCHING TELEVISION: NEARLY EVERY DAY
SUM OF ALL RESPONSES TO PHQ QUESTIONS 1-9: 21
3. TROUBLE FALLING OR STAYING ASLEEP OR SLEEPING TOO MUCH: NEARLY EVERY DAY
6. FEELING BAD ABOUT YOURSELF - OR THAT YOU ARE A FAILURE OR HAVE LET YOURSELF OR YOUR FAMILY DOWN: SEVERAL DAYS
10. IF YOU CHECKED OFF ANY PROBLEMS, HOW DIFFICULT HAVE THESE PROBLEMS MADE IT FOR YOU TO DO YOUR WORK, TAKE CARE OF THINGS AT HOME, OR GET ALONG WITH OTHER PEOPLE: EXTREMELY DIFFICULT
9. THOUGHTS THAT YOU WOULD BE BETTER OFF DEAD, OR OF HURTING YOURSELF: NOT AT ALL

## 2024-05-28 ASSESSMENT — ENCOUNTER SYMPTOMS
ARTHRALGIAS: 0
DYSPHORIC MOOD: 0
EYE PAIN: 0
NERVOUS/ANXIOUS: 0
ABDOMINAL DISTENTION: 0
FLANK PAIN: 0
COLOR CHANGE: 0
SORE THROAT: 0
AGITATION: 0
MYALGIAS: 0
COUGH: 0
CONSTITUTIONAL NEGATIVE: 1
DECREASED CONCENTRATION: 0
SINUS PRESSURE: 0
DIZZINESS: 0
SLEEP DISTURBANCE: 0
ADENOPATHY: 0
CHEST TIGHTNESS: 0
FATIGUE: 0
NECK STIFFNESS: 0
APPETITE CHANGE: 0
CONFUSION: 0
RECTAL PAIN: 0
CONSTIPATION: 0
ACTIVITY CHANGE: 0
SEIZURES: 0
POLYDIPSIA: 0
SPEECH DIFFICULTY: 0
RHINORRHEA: 0
DYSURIA: 0
ABDOMINAL PAIN: 0
DIARRHEA: 0
STRIDOR: 0
HEMATURIA: 0
POLYPHAGIA: 0
PHOTOPHOBIA: 0
SINUS PAIN: 0
BLOOD IN STOOL: 0
TROUBLE SWALLOWING: 0
FEVER: 0

## 2024-05-28 ASSESSMENT — COLUMBIA-SUICIDE SEVERITY RATING SCALE - C-SSRS
1. IN THE PAST MONTH, HAVE YOU WISHED YOU WERE DEAD OR WISHED YOU COULD GO TO SLEEP AND NOT WAKE UP?: NO
6. HAVE YOU EVER DONE ANYTHING, STARTED TO DO ANYTHING, OR PREPARED TO DO ANYTHING TO END YOUR LIFE?: NO
2. HAVE YOU ACTUALLY HAD ANY THOUGHTS OF KILLING YOURSELF?: NO

## 2024-05-28 NOTE — PROGRESS NOTES
"Subjective   Patient ID: Justin Hanna is a 48 y.o. male who presents for Follow-up and Hypertension.    Pt is in office to follow up on his amlodipine and losartan medication. Pt states medication is working well for him. Pt states he's only experienced dizziness and is lightheaded when he is transitioning from sitting to standing.        Review of Systems   Constitutional: Negative.  Negative for activity change, appetite change, fatigue and fever.   HENT:  Negative for congestion, dental problem, ear discharge, ear pain, mouth sores, rhinorrhea, sinus pressure, sinus pain, sore throat, tinnitus and trouble swallowing.    Eyes:  Negative for photophobia, pain and visual disturbance.   Respiratory:  Negative for cough, chest tightness and stridor.    Gastrointestinal:  Negative for abdominal distention, abdominal pain, blood in stool, constipation, diarrhea and rectal pain.   Endocrine: Negative for cold intolerance, heat intolerance, polydipsia, polyphagia and polyuria.   Genitourinary:  Negative for dysuria, flank pain, hematuria and urgency.   Musculoskeletal:  Negative for arthralgias, gait problem, myalgias and neck stiffness.   Skin:  Negative for color change and rash.   Allergic/Immunologic: Negative for environmental allergies and food allergies.   Neurological:  Negative for dizziness, seizures, syncope and speech difficulty.   Hematological:  Negative for adenopathy.   Psychiatric/Behavioral:  Negative for agitation, confusion, decreased concentration, dysphoric mood and sleep disturbance. The patient is not nervous/anxious.        Objective   /70   Pulse 81   Temp 36.1 °C (97 °F) (Temporal)   Resp 16   Ht 1.93 m (6' 4\")   Wt 103 kg (227 lb 6.4 oz)   SpO2 96%   BMI 27.68 kg/m²     Physical Exam  Vitals reviewed.   Constitutional:       General: He is not in acute distress.     Appearance: Normal appearance. He is not ill-appearing or diaphoretic.   HENT:      Head: Normocephalic.      Right " Ear: Tympanic membrane and external ear normal.      Left Ear: Tympanic membrane and external ear normal.      Nose: Nose normal. No congestion.      Mouth/Throat:      Pharynx: No posterior oropharyngeal erythema.   Eyes:      General:         Right eye: No discharge.         Left eye: No discharge.      Extraocular Movements: Extraocular movements intact.      Conjunctiva/sclera: Conjunctivae normal.      Pupils: Pupils are equal, round, and reactive to light.   Cardiovascular:      Rate and Rhythm: Normal rate and regular rhythm.      Pulses: Normal pulses.      Heart sounds: Normal heart sounds. No murmur heard.  Pulmonary:      Effort: Pulmonary effort is normal. No respiratory distress.      Breath sounds: Normal breath sounds. No wheezing or rales.   Chest:      Chest wall: No tenderness.   Abdominal:      General: Abdomen is flat. Bowel sounds are normal. There is no distension.      Palpations: There is no mass.      Tenderness: There is no abdominal tenderness. There is no guarding.   Musculoskeletal:         General: No tenderness. Normal range of motion.      Cervical back: Normal range of motion and neck supple. No tenderness.      Right lower leg: No edema.      Left lower leg: No edema.   Skin:     General: Skin is dry.      Coloration: Skin is not jaundiced.      Findings: No bruising, erythema or rash.   Neurological:      General: No focal deficit present.      Mental Status: He is alert and oriented to person, place, and time. Mental status is at baseline.      Cranial Nerves: No cranial nerve deficit.      Sensory: No sensory deficit.      Coordination: Coordination normal.      Gait: Gait normal.   Psychiatric:         Thought Content: Thought content normal.         Judgment: Judgment normal.      Comments: Anxious         Assessment/Plan   Problem List Items Addressed This Visit             ICD-10-CM    Posttraumatic stress disorder F43.10    Primary hypertension - Primary I10    Pulmonary  hypertension (Multi) I27.20

## 2024-05-28 NOTE — PATIENT INSTRUCTIONS
Follow up in 3 months    Continue current medications and therapy for chronic medical conditions.    Patient was advised importance of proper diet/nutrition in addition adequate hydration. Patient was encouraged moderate exercise program to include 30 minutes daily for 5 days of the week or 150 minutes weekly. Patient will follow-up with us as scheduled.    I personally reviewed the OARRS report for this patient. I have considered the risks of abuse, dependence, addiction, and diversion.    UDS/CSA:    Hold amlodipine 5 mgs daily

## 2024-09-22 NOTE — PROGRESS NOTES
Physical Therapy  Physical Therapy Treatment    Patient Name: Justin Hanna  MRN: [id number]  Today's Date: 1/15/2024  Visit #3 of 18 by 2/28/24   Time in: 0905 Time out: 0945    Assessment:   Pt. tolerates treatment well. His upper glute max area seems more reactive to needling + stim and more tender to the touch. If walking provokes more than prolonged standing- we'll dial back in on some gluteal strengthening and continue to pay attention to this area with manual therapies.    Plan:   Pt. returns later this week, when we'll continue this conversational thread and recheck the neck/ L upper shoulder area.     Current Problem  1. Other cervical disc displacement at C4-C5 level        2. Chronic right-sided low back pain without sciatica        3. Muscle weakness (generalized)        4. Neck pain          Subjective    Pt. reports that his neck feels okay today, but that he woke up with a lot of stiffness in the lower back- B sides near the belt line. No reported issue with HEP.     He also has a speech therapy visit later today.     Objective   Treatments:  Manual Therapy (06769): 40 minutes  Palpatory assessment performed.   Dry needling provided to lateral erectors B at L4-5 in prone, x2 each in series, and then to upper medial gluteals near PSIS on each side, also in prone, with TENS added to elicit local twitch responses to dec resting tonus and associated discomfort.  Soft tissue mobilization provided to each of these, including petrissage, stripping, cross frictions, and MFR to tolerance to promote local blood flow and soft tissue extensibility.  Asking pt. to pay attention to static standing vs. walking over the next few days. Keeping HEP the same.   The patient is a 17y Female complaining of multiple medical complaints.

## 2024-09-24 ENCOUNTER — APPOINTMENT (OUTPATIENT)
Dept: PRIMARY CARE | Facility: CLINIC | Age: 49
End: 2024-09-24

## 2024-09-24 VITALS
BODY MASS INDEX: 28.54 KG/M2 | RESPIRATION RATE: 16 BRPM | TEMPERATURE: 97.2 F | SYSTOLIC BLOOD PRESSURE: 134 MMHG | DIASTOLIC BLOOD PRESSURE: 80 MMHG | WEIGHT: 234.4 LBS | HEART RATE: 86 BPM | OXYGEN SATURATION: 96 % | HEIGHT: 76 IN

## 2024-09-24 DIAGNOSIS — I10 PRIMARY HYPERTENSION: Primary | ICD-10-CM

## 2024-09-24 DIAGNOSIS — J30.1 SEASONAL ALLERGIC RHINITIS DUE TO POLLEN: ICD-10-CM

## 2024-09-24 DIAGNOSIS — G47.33 SEVERE OBSTRUCTIVE SLEEP APNEA: ICD-10-CM

## 2024-09-24 DIAGNOSIS — J40 BRONCHITIS: ICD-10-CM

## 2024-09-24 PROCEDURE — 3079F DIAST BP 80-89 MM HG: CPT | Performed by: FAMILY MEDICINE

## 2024-09-24 PROCEDURE — 99212 OFFICE O/P EST SF 10 MIN: CPT | Performed by: FAMILY MEDICINE

## 2024-09-24 PROCEDURE — 3075F SYST BP GE 130 - 139MM HG: CPT | Performed by: FAMILY MEDICINE

## 2024-09-24 PROCEDURE — 1036F TOBACCO NON-USER: CPT | Performed by: FAMILY MEDICINE

## 2024-09-24 PROCEDURE — 3008F BODY MASS INDEX DOCD: CPT | Performed by: FAMILY MEDICINE

## 2024-09-24 RX ORDER — MIRTAZAPINE 15 MG/1
15 TABLET, FILM COATED ORAL NIGHTLY
COMMUNITY
Start: 2024-09-23

## 2024-09-24 RX ORDER — CETIRIZINE HYDROCHLORIDE 10 MG/1
10 TABLET ORAL DAILY
Qty: 30 TABLET | Refills: 5 | Status: SHIPPED | OUTPATIENT
Start: 2024-09-24 | End: 2025-03-23

## 2024-09-24 RX ORDER — PREDNISONE 10 MG/1
TABLET ORAL
Qty: 25 TABLET | Refills: 0 | Status: SHIPPED | OUTPATIENT
Start: 2024-09-24

## 2024-09-24 RX ORDER — PAROXETINE HYDROCHLORIDE 20 MG/1
20 TABLET, FILM COATED ORAL
COMMUNITY
Start: 2024-09-23

## 2024-09-24 ASSESSMENT — ENCOUNTER SYMPTOMS
ABDOMINAL PAIN: 0
NECK STIFFNESS: 0
SEIZURES: 0
ARTHRALGIAS: 0
CONFUSION: 0
ACTIVITY CHANGE: 0
SINUS PAIN: 0
EYE PAIN: 0
MYALGIAS: 0
TROUBLE SWALLOWING: 0
COUGH: 1
POLYPHAGIA: 0
FATIGUE: 0
SINUS PRESSURE: 0
ADENOPATHY: 0
DYSPHORIC MOOD: 0
HEADACHES: 1
CHEST TIGHTNESS: 0
APPETITE CHANGE: 0
SPEECH DIFFICULTY: 0
CONSTITUTIONAL NEGATIVE: 1
PHOTOPHOBIA: 0
AGITATION: 0
COLOR CHANGE: 0
SHORTNESS OF BREATH: 1
HEMATURIA: 0
BLOOD IN STOOL: 0
STRIDOR: 0
ABDOMINAL DISTENTION: 0
CONSTIPATION: 0
PALPITATIONS: 0
POLYDIPSIA: 0
DECREASED CONCENTRATION: 0
NERVOUS/ANXIOUS: 0
DEPRESSION: 0
SORE THROAT: 0
HYPERTENSION: 1
DIZZINESS: 0
FLANK PAIN: 0
RECTAL PAIN: 0
DIARRHEA: 0
RHINORRHEA: 0
OCCASIONAL FEELINGS OF UNSTEADINESS: 0
DYSURIA: 0
FEVER: 0
LOSS OF SENSATION IN FEET: 0
SLEEP DISTURBANCE: 0

## 2024-09-24 NOTE — PROGRESS NOTES
Subjective   Patient ID: Justin Hanna is a 49 y.o. male who presents for Hypertension.    Patient denies any symptoms or concerns today.    Hypertension  This is a recurrent problem. The current episode started more than 1 year ago. The problem is unchanged. Associated symptoms include headaches and shortness of breath. Pertinent negatives include no chest pain or palpitations. (Light headache when standing up) There are no associated agents to hypertension. There are no known risk factors for coronary artery disease.        Review of Systems   Constitutional: Negative.  Negative for activity change, appetite change, fatigue and fever.   HENT:  Positive for congestion. Negative for dental problem, ear discharge, ear pain, mouth sores, rhinorrhea, sinus pressure, sinus pain, sore throat, tinnitus and trouble swallowing.    Eyes:  Negative for photophobia, pain and visual disturbance.   Respiratory:  Positive for cough and shortness of breath. Negative for chest tightness and stridor.    Cardiovascular:  Negative for chest pain and palpitations.   Gastrointestinal:  Negative for abdominal distention, abdominal pain, blood in stool, constipation, diarrhea and rectal pain.   Endocrine: Negative for cold intolerance, heat intolerance, polydipsia, polyphagia and polyuria.   Genitourinary:  Negative for dysuria, flank pain, hematuria and urgency.   Musculoskeletal:  Negative for arthralgias, gait problem, myalgias and neck stiffness.   Skin:  Negative for color change and rash.   Allergic/Immunologic: Negative for environmental allergies and food allergies.   Neurological:  Positive for headaches. Negative for dizziness, seizures, syncope and speech difficulty.   Hematological:  Negative for adenopathy.   Psychiatric/Behavioral:  Negative for agitation, confusion, decreased concentration, dysphoric mood and sleep disturbance. The patient is not nervous/anxious.        Objective   /80 (BP Location: Right arm, Patient  "Position: Sitting, BP Cuff Size: Large adult)   Pulse 86   Temp 36.2 °C (97.2 °F)   Resp 16   Ht 1.93 m (6' 4\")   Wt 106 kg (234 lb 6.4 oz)   SpO2 96%   BMI 28.53 kg/m²     Physical Exam  Vitals reviewed.   Constitutional:       General: He is not in acute distress.     Appearance: Normal appearance. He is normal weight. He is not ill-appearing or diaphoretic.   HENT:      Head: Normocephalic.      Right Ear: Tympanic membrane and external ear normal.      Left Ear: Tympanic membrane and external ear normal.      Nose: Nose normal. No congestion.      Mouth/Throat:      Pharynx: No posterior oropharyngeal erythema.   Eyes:      General:         Right eye: No discharge.         Left eye: No discharge.      Extraocular Movements: Extraocular movements intact.      Conjunctiva/sclera: Conjunctivae normal.      Pupils: Pupils are equal, round, and reactive to light.   Cardiovascular:      Rate and Rhythm: Normal rate and regular rhythm.      Pulses: Normal pulses.      Heart sounds: Normal heart sounds. No murmur heard.  Pulmonary:      Effort: Pulmonary effort is normal. No respiratory distress.      Breath sounds: Normal breath sounds. No wheezing or rales.   Chest:      Chest wall: No tenderness.   Abdominal:      General: Abdomen is flat. Bowel sounds are normal. There is no distension.      Palpations: There is no mass.      Tenderness: There is no abdominal tenderness. There is no guarding.   Musculoskeletal:         General: No tenderness. Normal range of motion.      Cervical back: Normal range of motion and neck supple. No tenderness.      Right lower leg: No edema.      Left lower leg: No edema.   Skin:     General: Skin is dry.      Coloration: Skin is not jaundiced.      Findings: No bruising, erythema or rash.   Neurological:      General: No focal deficit present.      Mental Status: He is alert and oriented to person, place, and time. Mental status is at baseline.      Cranial Nerves: No cranial " nerve deficit.      Sensory: No sensory deficit.      Coordination: Coordination normal.      Gait: Gait normal.   Psychiatric:         Mood and Affect: Mood normal.         Thought Content: Thought content normal.         Judgment: Judgment normal.         Assessment/Plan   Problem List Items Addressed This Visit             ICD-10-CM    Primary hypertension - Primary I10    Severe obstructive sleep apnea G47.33     Other Visit Diagnoses         Codes    Bronchitis     J40    Relevant Medications    predniSONE (Deltasone) 10 mg tablet              Scribe Attestation  By signing my name below, ITami RMA , Scribe   attest that this documentation has been prepared under the direction and in the presence of Elpidio Jean Baptiste DO.   Provider Attestation - Scribe documentation    All medical record entries made by the Scribe were at my direction and personally dictated by me. I have reviewed the chart and agree that the record accurately reflects my personal performance of the history, physical exam, discussion and plan.

## 2024-09-24 NOTE — PATIENT INSTRUCTIONS
Follow up in 4 months     Continue current medications and therapy for chronic medical conditions.    Patient was advised importance of proper diet/nutrition in addition adequate hydration. Patient was encouraged moderate exercise program to include 30 minutes daily for 5 days of the week or 150 minutes weekly. Patient will follow-up with us as scheduled.    Patient is scheduled to see cardiology end of the year    He is scheduling for CT of chest.     Start Prednisone 10 mgs tapered

## 2024-10-06 DIAGNOSIS — J30.9 ALLERGIC RHINITIS, UNSPECIFIED SEASONALITY, UNSPECIFIED TRIGGER: ICD-10-CM

## 2024-10-07 RX ORDER — FLUTICASONE PROPIONATE 50 MCG
1 SPRAY, SUSPENSION (ML) NASAL DAILY
Qty: 48 ML | Refills: 2 | Status: SHIPPED | OUTPATIENT
Start: 2024-10-07

## 2024-12-24 ENCOUNTER — APPOINTMENT (OUTPATIENT)
Dept: PRIMARY CARE | Facility: CLINIC | Age: 49
End: 2024-12-24

## 2024-12-24 VITALS
HEIGHT: 76 IN | OXYGEN SATURATION: 99 % | SYSTOLIC BLOOD PRESSURE: 126 MMHG | WEIGHT: 244 LBS | RESPIRATION RATE: 18 BRPM | HEART RATE: 70 BPM | DIASTOLIC BLOOD PRESSURE: 78 MMHG | TEMPERATURE: 97.4 F | BODY MASS INDEX: 29.71 KG/M2

## 2024-12-24 DIAGNOSIS — F43.10 POSTTRAUMATIC STRESS DISORDER: ICD-10-CM

## 2024-12-24 DIAGNOSIS — I10 PRIMARY HYPERTENSION: Primary | ICD-10-CM

## 2024-12-24 DIAGNOSIS — G47.33 SEVERE OBSTRUCTIVE SLEEP APNEA: ICD-10-CM

## 2024-12-24 DIAGNOSIS — I51.7 RIGHT VENTRICULAR HYPERTROPHY: ICD-10-CM

## 2024-12-24 PROCEDURE — 99212 OFFICE O/P EST SF 10 MIN: CPT | Performed by: FAMILY MEDICINE

## 2024-12-24 RX ORDER — METOPROLOL SUCCINATE 25 MG/1
25 TABLET, EXTENDED RELEASE ORAL DAILY
Qty: 30 TABLET | Refills: 2 | Status: SHIPPED | OUTPATIENT
Start: 2024-12-24 | End: 2025-06-22

## 2024-12-24 ASSESSMENT — ENCOUNTER SYMPTOMS
SHORTNESS OF BREATH: 0
ABDOMINAL PAIN: 0
CONFUSION: 0
SORE THROAT: 0
HEADACHES: 0
POLYPHAGIA: 0
EYE PAIN: 0
COLOR CHANGE: 0
SEIZURES: 0
CHEST TIGHTNESS: 0
NECK STIFFNESS: 0
ABDOMINAL DISTENTION: 0
CONSTITUTIONAL NEGATIVE: 1
CONSTIPATION: 0
SLEEP DISTURBANCE: 0
RECTAL PAIN: 0
DIARRHEA: 0
SINUS PRESSURE: 0
AGITATION: 0
PHOTOPHOBIA: 0
HEMATURIA: 0
HYPERTENSION: 1
DYSPHORIC MOOD: 0
DYSURIA: 0
SINUS PAIN: 0
MYALGIAS: 0
POLYDIPSIA: 0
SPEECH DIFFICULTY: 0
ARTHRALGIAS: 0
FLANK PAIN: 0
PALPITATIONS: 0
NERVOUS/ANXIOUS: 0
FATIGUE: 0
ACTIVITY CHANGE: 0
DECREASED CONCENTRATION: 0
STRIDOR: 0
TROUBLE SWALLOWING: 0
FEVER: 0
APPETITE CHANGE: 0
DIZZINESS: 0
BLOOD IN STOOL: 0
ADENOPATHY: 0
COUGH: 0
RHINORRHEA: 0

## 2024-12-24 NOTE — PROGRESS NOTES
Subjective   Patient ID: Justin Hanna is a 49 y.o. male who presents for Hypertension.    Patient denies any symptoms or concerns today.    Hypertension  This is a recurrent problem. The current episode started more than 1 year ago. The problem is unchanged. Pertinent negatives include no chest pain, headaches, palpitations or shortness of breath. There are no associated agents to hypertension. There are no known risk factors for coronary artery disease.        Review of Systems   Constitutional: Negative.  Negative for activity change, appetite change, fatigue and fever.   HENT:  Negative for congestion, dental problem, ear discharge, ear pain, mouth sores, rhinorrhea, sinus pressure, sinus pain, sore throat, tinnitus and trouble swallowing.    Eyes:  Negative for photophobia, pain and visual disturbance.   Respiratory:  Negative for cough, chest tightness, shortness of breath and stridor.    Cardiovascular:  Negative for chest pain and palpitations.   Gastrointestinal:  Negative for abdominal distention, abdominal pain, blood in stool, constipation, diarrhea and rectal pain.   Endocrine: Negative for cold intolerance, heat intolerance, polydipsia, polyphagia and polyuria.   Genitourinary:  Negative for dysuria, flank pain, hematuria and urgency.   Musculoskeletal:  Negative for arthralgias, gait problem, myalgias and neck stiffness.   Skin:  Negative for color change and rash.   Allergic/Immunologic: Negative for environmental allergies and food allergies.   Neurological:  Negative for dizziness, seizures, syncope, speech difficulty and headaches.   Hematological:  Negative for adenopathy.   Psychiatric/Behavioral:  Negative for agitation, confusion, decreased concentration, dysphoric mood and sleep disturbance. The patient is not nervous/anxious.        Objective   /78 (BP Location: Right arm, Patient Position: Sitting, BP Cuff Size: Adult)   Pulse 70   Temp 36.3 °C (97.4 °F) (Skin)   Resp 18   Ht 1.93 m  "(6' 4\")   Wt 111 kg (244 lb)   SpO2 99%   BMI 29.70 kg/m²     Physical Exam  Vitals reviewed.   Constitutional:       General: He is not in acute distress.     Appearance: Normal appearance. He is normal weight. He is not ill-appearing or diaphoretic.   HENT:      Head: Normocephalic.      Right Ear: Tympanic membrane and external ear normal.      Left Ear: Tympanic membrane and external ear normal.      Nose: Nose normal. No congestion.      Mouth/Throat:      Pharynx: No posterior oropharyngeal erythema.   Eyes:      General:         Right eye: No discharge.         Left eye: No discharge.      Extraocular Movements: Extraocular movements intact.      Conjunctiva/sclera: Conjunctivae normal.      Pupils: Pupils are equal, round, and reactive to light.   Cardiovascular:      Rate and Rhythm: Normal rate and regular rhythm.      Pulses: Normal pulses.      Heart sounds: Normal heart sounds. No murmur heard.  Pulmonary:      Effort: Pulmonary effort is normal. No respiratory distress.      Breath sounds: Normal breath sounds. No wheezing or rales.   Chest:      Chest wall: No tenderness.   Abdominal:      General: Abdomen is flat. Bowel sounds are normal. There is no distension.      Palpations: There is no mass.      Tenderness: There is no abdominal tenderness. There is no guarding.   Musculoskeletal:         General: No tenderness. Normal range of motion.      Cervical back: Normal range of motion and neck supple. No tenderness.      Right lower leg: No edema.      Left lower leg: No edema.   Skin:     General: Skin is dry.      Coloration: Skin is not jaundiced.      Findings: No bruising, erythema or rash.   Neurological:      General: No focal deficit present.      Mental Status: He is alert and oriented to person, place, and time. Mental status is at baseline.      Cranial Nerves: No cranial nerve deficit.      Sensory: No sensory deficit.      Coordination: Coordination normal.      Gait: Gait normal. "   Psychiatric:         Mood and Affect: Mood normal.         Thought Content: Thought content normal.         Judgment: Judgment normal.         Assessment/Plan   Problem List Items Addressed This Visit             ICD-10-CM    Posttraumatic stress disorder F43.10    Primary hypertension - Primary I10    Relevant Medications    metoprolol succinate XL (Toprol-XL) 25 mg 24 hr tablet    Right ventricular hypertrophy I51.7    Relevant Medications    metoprolol succinate XL (Toprol-XL) 25 mg 24 hr tablet    Severe obstructive sleep apnea G47.33         Scribe Attestation  By signing my name below, I, HERMINIO Christine , Scribe   attest that this documentation has been prepared under the direction and in the presence of Elpidio Jean Baptiste DO.   Provider Attestation - Scribe documentation    All medical record entries made by the Scribe were at my direction and personally dictated by me. I have reviewed the chart and agree that the record accurately reflects my personal performance of the history, physical exam, discussion and plan.

## 2024-12-24 NOTE — PATIENT INSTRUCTIONS
Follow up with phone call in 2 weeks with regular follow-up visit in 3 months.  Report home BP readings with phone call    Continue current medications and therapy for chronic medical conditions.    Patient was advised importance of proper diet/nutrition in addition adequate hydration. Patient was encouraged moderate exercise program to include 30 minutes daily for 5 days of the week or 150 minutes weekly. Patient will follow-up with us as scheduled.    Start Toprol-XL 25 mg nightly    Consider discontinuing hydrochlorothiazide

## 2025-01-10 ENCOUNTER — TELEPHONE (OUTPATIENT)
Dept: PRIMARY CARE | Facility: CLINIC | Age: 50
End: 2025-01-10
Payer: COMMERCIAL

## 2025-01-10 NOTE — TELEPHONE ENCOUNTER
12/27 155/86  12/30 163/94  1/01 150/98  1/06 144/79  1/07 129/90   1/08 116/76  1/10 141/87    Recent BP checks, per LYNN just an FYI.

## 2025-01-27 DIAGNOSIS — I10 PRIMARY HYPERTENSION: ICD-10-CM

## 2025-01-27 RX ORDER — METOPROLOL SUCCINATE 25 MG/1
25 TABLET, EXTENDED RELEASE ORAL DAILY
Qty: 90 TABLET | Refills: 0 | Status: SHIPPED | OUTPATIENT
Start: 2025-01-27

## 2025-01-27 NOTE — TELEPHONE ENCOUNTER
Recent Visits  Date Type Provider Dept   12/24/24 Office Visit Elpidio Jean Baptiste, DO [facility]   09/24/24 Office Visit Elpidio Jean Baptiste, DO [facility]   Showing recent visits within past 180 days and meeting all other requirements  Future Appointments  Date Type Provider Dept   02/04/25 Appointment Elpidio Jean Baptiste, DO [facility]   04/24/25 Appointment Elpidio Jean Baptiste, DO [facility]   Showing future appointments within next 90 days and meeting all other requirements

## 2025-02-04 ENCOUNTER — APPOINTMENT (OUTPATIENT)
Dept: PRIMARY CARE | Facility: CLINIC | Age: 50
End: 2025-02-04

## 2025-02-04 VITALS
WEIGHT: 243 LBS | DIASTOLIC BLOOD PRESSURE: 76 MMHG | BODY MASS INDEX: 29.59 KG/M2 | HEART RATE: 68 BPM | RESPIRATION RATE: 19 BRPM | SYSTOLIC BLOOD PRESSURE: 118 MMHG | HEIGHT: 76 IN | OXYGEN SATURATION: 97 % | TEMPERATURE: 97.7 F

## 2025-02-04 DIAGNOSIS — J30.2 SEASONAL ALLERGIC RHINITIS, UNSPECIFIED TRIGGER: ICD-10-CM

## 2025-02-04 DIAGNOSIS — I51.7 RVH (RIGHT VENTRICULAR HYPERTROPHY): ICD-10-CM

## 2025-02-04 DIAGNOSIS — I10 PRIMARY HYPERTENSION: Primary | ICD-10-CM

## 2025-02-04 PROCEDURE — 99212 OFFICE O/P EST SF 10 MIN: CPT | Performed by: FAMILY MEDICINE

## 2025-02-04 ASSESSMENT — ENCOUNTER SYMPTOMS
DIZZINESS: 0
DYSURIA: 0
ARTHRALGIAS: 0
DECREASED CONCENTRATION: 0
HYPERTENSION: 1
FATIGUE: 0
FEVER: 0
CONSTIPATION: 0
NECK STIFFNESS: 0
TROUBLE SWALLOWING: 0
MYALGIAS: 0
SPEECH DIFFICULTY: 0
SEIZURES: 0
ADENOPATHY: 0
CHEST TIGHTNESS: 0
DYSPHORIC MOOD: 0
EYE PAIN: 0
BLURRED VISION: 0
AGITATION: 0
STRIDOR: 0
ACTIVITY CHANGE: 0
CONSTITUTIONAL NEGATIVE: 1
SINUS PRESSURE: 0
ABDOMINAL PAIN: 0
SHORTNESS OF BREATH: 1
NERVOUS/ANXIOUS: 0
FLANK PAIN: 0
COUGH: 0
ABDOMINAL DISTENTION: 0
HEMATURIA: 0
OCCASIONAL FEELINGS OF UNSTEADINESS: 0
PHOTOPHOBIA: 0
SORE THROAT: 0
APPETITE CHANGE: 0
SINUS PAIN: 0
COLOR CHANGE: 0
PALPITATIONS: 0
RHINORRHEA: 1
HEADACHES: 0
DEPRESSION: 0
POLYDIPSIA: 0
BLOOD IN STOOL: 0
LOSS OF SENSATION IN FEET: 0
DIARRHEA: 0
POLYPHAGIA: 0
SLEEP DISTURBANCE: 0
CONFUSION: 0
RECTAL PAIN: 0

## 2025-02-04 ASSESSMENT — PATIENT HEALTH QUESTIONNAIRE - PHQ9
1. LITTLE INTEREST OR PLEASURE IN DOING THINGS: NOT AT ALL
SUM OF ALL RESPONSES TO PHQ9 QUESTIONS 1 AND 2: 0
2. FEELING DOWN, DEPRESSED OR HOPELESS: NOT AT ALL

## 2025-02-04 NOTE — PROGRESS NOTES
Subjective   Patient ID: Justin Hanna is a 49 y.o. male who presents for Hypertension.    Patient denies any symptoms or concerns today.    Hypertension  This is a recurrent problem. The current episode started more than 1 year ago. The problem has been gradually improving since onset. The problem is controlled. Associated symptoms include shortness of breath. Pertinent negatives include no blurred vision, chest pain, headaches or palpitations. There are no associated agents to hypertension. There are no known risk factors for coronary artery disease.        Review of Systems   Constitutional: Negative.  Negative for activity change, appetite change, fatigue and fever.   HENT:  Positive for congestion and rhinorrhea. Negative for dental problem, ear discharge, ear pain, mouth sores, sinus pressure, sinus pain, sore throat, tinnitus and trouble swallowing.    Eyes:  Negative for blurred vision, photophobia, pain and visual disturbance.   Respiratory:  Positive for shortness of breath. Negative for cough, chest tightness and stridor.    Cardiovascular:  Negative for chest pain and palpitations.   Gastrointestinal:  Negative for abdominal distention, abdominal pain, blood in stool, constipation, diarrhea and rectal pain.   Endocrine: Negative for cold intolerance, heat intolerance, polydipsia, polyphagia and polyuria.   Genitourinary:  Negative for dysuria, flank pain, hematuria and urgency.   Musculoskeletal:  Negative for arthralgias, gait problem, myalgias and neck stiffness.   Skin:  Negative for color change and rash.   Allergic/Immunologic: Negative for environmental allergies and food allergies.   Neurological:  Negative for dizziness, seizures, syncope, speech difficulty and headaches.   Hematological:  Negative for adenopathy.   Psychiatric/Behavioral:  Negative for agitation, confusion, decreased concentration, dysphoric mood and sleep disturbance. The patient is not nervous/anxious.        Objective   BP  "118/76 (BP Location: Right arm, Patient Position: Sitting, BP Cuff Size: Adult)   Pulse 68   Temp 36.5 °C (97.7 °F) (Skin)   Resp 19   Ht 1.93 m (6' 4\")   Wt 110 kg (243 lb)   SpO2 97%   BMI 29.58 kg/m²     Physical Exam  Vitals reviewed.   Constitutional:       General: He is not in acute distress.     Appearance: Normal appearance. He is normal weight. He is not ill-appearing or diaphoretic.   HENT:      Head: Normocephalic.      Right Ear: Tympanic membrane and external ear normal.      Left Ear: Tympanic membrane and external ear normal.      Nose: Nose normal. No congestion.      Mouth/Throat:      Pharynx: No posterior oropharyngeal erythema.   Eyes:      General:         Right eye: No discharge.         Left eye: No discharge.      Extraocular Movements: Extraocular movements intact.      Conjunctiva/sclera: Conjunctivae normal.      Pupils: Pupils are equal, round, and reactive to light.   Cardiovascular:      Rate and Rhythm: Normal rate and regular rhythm.      Pulses: Normal pulses.      Heart sounds: Normal heart sounds. No murmur heard.  Pulmonary:      Effort: Pulmonary effort is normal. No respiratory distress.      Breath sounds: Normal breath sounds. No wheezing or rales.   Chest:      Chest wall: No tenderness.   Abdominal:      General: Abdomen is flat. Bowel sounds are normal. There is no distension.      Palpations: There is no mass.      Tenderness: There is no abdominal tenderness. There is no guarding.   Musculoskeletal:         General: No tenderness. Normal range of motion.      Cervical back: Normal range of motion and neck supple. No tenderness.      Right lower leg: No edema.      Left lower leg: No edema.   Skin:     General: Skin is dry.      Coloration: Skin is not jaundiced.      Findings: No bruising, erythema or rash.   Neurological:      General: No focal deficit present.      Mental Status: He is alert and oriented to person, place, and time. Mental status is at baseline. "      Cranial Nerves: No cranial nerve deficit.      Sensory: No sensory deficit.      Coordination: Coordination normal.      Gait: Gait normal.   Psychiatric:         Mood and Affect: Mood normal.         Thought Content: Thought content normal.         Judgment: Judgment normal.         Assessment/Plan   Problem List Items Addressed This Visit             ICD-10-CM    Primary hypertension - Primary I10     Other Visit Diagnoses         Codes    RVH (right ventricular hypertrophy)     I51.7    Relevant Medications    empagliflozin (Jardiance) 10 mg    Seasonal allergic rhinitis, unspecified trigger     J30.2    Relevant Orders    Referral to Allergy          Provider Attestation - Scribe documentation    All medical record entries made by the Scribe were at my direction and personally dictated by me. I have reviewed the chart and agree that the record accurately reflects my personal performance of the history, physical exam, discussion and plan.

## 2025-02-13 DIAGNOSIS — I27.20 PULMONARY HYPERTENSION (MULTI): ICD-10-CM

## 2025-02-14 RX ORDER — AMLODIPINE BESYLATE 2.5 MG/1
2.5 TABLET ORAL DAILY
Qty: 90 TABLET | Refills: 1 | Status: SHIPPED | OUTPATIENT
Start: 2025-02-14

## 2025-02-27 DIAGNOSIS — J30.1 SEASONAL ALLERGIC RHINITIS DUE TO POLLEN: ICD-10-CM

## 2025-02-27 RX ORDER — CETIRIZINE HYDROCHLORIDE 10 MG/1
10 TABLET ORAL DAILY
Qty: 90 TABLET | Refills: 1 | Status: SHIPPED | OUTPATIENT
Start: 2025-02-27

## 2025-02-27 NOTE — TELEPHONE ENCOUNTER
Recent Visits  Date Type Provider Dept   02/04/25 Office Visit Elpidio Jean Baptiste, DO [facility]   12/24/24 Office Visit Elpidio Jean Baptiste, DO [facility]   09/24/24 Office Visit Elpidio Jean Baptiste, DO [facility]   Showing recent visits within past 180 days and meeting all other requirements  Future Appointments  Date Type Provider Dept   04/24/25 Appointment Elpidio Jean Baptiste, DO [facility]   Showing future appointments within next 90 days and meeting all other requirements

## 2025-04-24 ENCOUNTER — APPOINTMENT (OUTPATIENT)
Dept: PRIMARY CARE | Facility: CLINIC | Age: 50
End: 2025-04-24

## 2025-04-28 DIAGNOSIS — I10 PRIMARY HYPERTENSION: ICD-10-CM

## 2025-04-28 RX ORDER — METOPROLOL SUCCINATE 25 MG/1
TABLET, EXTENDED RELEASE ORAL
Qty: 90 TABLET | Refills: 0 | Status: SHIPPED | OUTPATIENT
Start: 2025-04-28

## 2025-04-28 NOTE — TELEPHONE ENCOUNTER
Recent Visits  Date Type Provider Dept   02/04/25 Office Visit Elpidio Jean Baptiste, DO [facility]   12/24/24 Office Visit Elpidio Jean Baptiste, DO [facility]   09/24/24 Office Visit Elpidio Jean Baptiste, DO [facility]   05/28/24 Office Visit Elpidio Jean Baptiste, DO [facility]   Showing recent visits within past 365 days and meeting all other requirements  Future Appointments  Date Type Provider Dept   05/29/25 Appointment Elpidio Jean Baptiste, DO [facility]   Showing future appointments within next 90 days and meeting all other requirements

## 2025-05-29 ENCOUNTER — APPOINTMENT (OUTPATIENT)
Dept: PRIMARY CARE | Facility: CLINIC | Age: 50
End: 2025-05-29

## 2025-05-29 VITALS
OXYGEN SATURATION: 97 % | DIASTOLIC BLOOD PRESSURE: 62 MMHG | HEART RATE: 68 BPM | BODY MASS INDEX: 28.74 KG/M2 | RESPIRATION RATE: 14 BRPM | TEMPERATURE: 98.6 F | SYSTOLIC BLOOD PRESSURE: 100 MMHG | WEIGHT: 236 LBS | HEIGHT: 76 IN

## 2025-05-29 DIAGNOSIS — I10 PRIMARY HYPERTENSION: Primary | ICD-10-CM

## 2025-05-29 DIAGNOSIS — I27.20 PULMONARY HYPERTENSION (MULTI): ICD-10-CM

## 2025-05-29 DIAGNOSIS — R10.30 LOWER ABDOMINAL PAIN: ICD-10-CM

## 2025-05-29 DIAGNOSIS — Z12.11 ENCOUNTER FOR COLORECTAL CANCER SCREENING: ICD-10-CM

## 2025-05-29 DIAGNOSIS — Z12.12 ENCOUNTER FOR COLORECTAL CANCER SCREENING: ICD-10-CM

## 2025-05-29 DIAGNOSIS — I51.7 RIGHT VENTRICULAR HYPERTROPHY: ICD-10-CM

## 2025-05-29 PROCEDURE — 99212 OFFICE O/P EST SF 10 MIN: CPT | Performed by: FAMILY MEDICINE

## 2025-05-29 RX ORDER — LOSARTAN POTASSIUM AND HYDROCHLOROTHIAZIDE 12.5; 1 MG/1; MG/1
1 TABLET ORAL DAILY
Qty: 30 TABLET | Refills: 11 | Status: SHIPPED | OUTPATIENT
Start: 2025-05-29 | End: 2026-05-29

## 2025-05-29 RX ORDER — BACLOFEN 10 MG/1
TABLET ORAL
COMMUNITY

## 2025-05-29 NOTE — PATIENT INSTRUCTIONS
Follow up in 4.5 months    Continue current medications and therapy for chronic medical conditions.    Patient was advised importance of proper diet/nutrition in addition adequate hydration. Patient was encouraged moderate exercise program to include 30 minutes daily for 5 days of the week or 150 minutes weekly. Patient will follow-up with us as scheduled.
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
## 2025-06-03 ENCOUNTER — TELEPHONE (OUTPATIENT)
Dept: GASTROENTEROLOGY | Facility: CLINIC | Age: 50
End: 2025-06-03
Payer: COMMERCIAL

## 2025-06-03 NOTE — TELEPHONE ENCOUNTER
Dr. Jean Baptiste put in an order for colonoscopy.  Please call  to schedule with one of our GI providers.   Thank you.    [facility]

## 2025-06-29 DIAGNOSIS — J30.9 ALLERGIC RHINITIS, UNSPECIFIED SEASONALITY, UNSPECIFIED TRIGGER: ICD-10-CM

## 2025-06-30 RX ORDER — FLUNISOLIDE 0.25 MG/ML
2 SOLUTION NASAL 2 TIMES DAILY
Qty: 25 ML | Refills: 2 | Status: SHIPPED | OUTPATIENT
Start: 2025-06-30

## 2025-06-30 NOTE — TELEPHONE ENCOUNTER
Recent Visits  Date Type Provider Dept   05/29/25 Office Visit Elpidio Jean Baptiste DO [facility]   02/04/25 Office Visit Elpidio Jean Baptiste, DO [facility]   Showing recent visits within past 180 days and meeting all other requirements  Future Appointments  No visits were found meeting these conditions.  Showing future appointments within next 90 days and meeting all other requirements

## 2025-07-15 ENCOUNTER — OFFICE VISIT (OUTPATIENT)
Dept: PRIMARY CARE | Facility: CLINIC | Age: 50
End: 2025-07-15

## 2025-07-15 VITALS
RESPIRATION RATE: 16 BRPM | HEIGHT: 76 IN | OXYGEN SATURATION: 97 % | TEMPERATURE: 98 F | DIASTOLIC BLOOD PRESSURE: 70 MMHG | HEART RATE: 47 BPM | WEIGHT: 233 LBS | SYSTOLIC BLOOD PRESSURE: 108 MMHG | BODY MASS INDEX: 28.37 KG/M2

## 2025-07-15 DIAGNOSIS — G47.33 SEVERE OBSTRUCTIVE SLEEP APNEA: ICD-10-CM

## 2025-07-15 DIAGNOSIS — I10 PRIMARY HYPERTENSION: ICD-10-CM

## 2025-07-15 DIAGNOSIS — S46.211A BICEPS MUSCLE TEAR, RIGHT, INITIAL ENCOUNTER: Primary | ICD-10-CM

## 2025-07-15 PROCEDURE — 99212 OFFICE O/P EST SF 10 MIN: CPT | Performed by: FAMILY MEDICINE

## 2025-07-15 RX ORDER — CLONIDINE HYDROCHLORIDE 0.1 MG/1
0.1 TABLET ORAL NIGHTLY
COMMUNITY
Start: 2025-07-08

## 2025-07-15 ASSESSMENT — ENCOUNTER SYMPTOMS
DEPRESSION: 1
OCCASIONAL FEELINGS OF UNSTEADINESS: 0
LOSS OF SENSATION IN FEET: 0

## 2025-07-15 NOTE — PROGRESS NOTES
Subjective   Patient ID: Justin Hanna is a 49 y.o. male who presents for Arm Pain (Patient reports possible injury to his right arm biceps while soing yard work last 07/11/2025. OTC medication used to manage symptoms. Pain scale 7/10.).  History of Present Illness  The patient is a 49-year-old  male who presents for arm pain with a possible injury to his right arm, specifically the bicep, sustained while doing yard work on 07/11/2025. He rates the pain as 7 out of 10 on the pain scale.    The pain began suddenly during yard work, which involved using a manual saw for approximately 3 to 4 hours. He describes the pain as a dull ache. He has not sought emergency care for this issue and expresses concern about potential long-term complications from the injury, particularly as he plans to take his children on vacation next week. He is right-handed and has no metal implants that would contraindicate an MRI. Since the injury, he has been applying ice to the area and started taking Aleve for inflammation. He also took baclofen, prescribed by his neurologist for neuropathy, but is unsure if it provided any relief. He has not required any additional pain medication and reports sleeping well last night.    He has not been taking Zyrtec for allergies as he has not refilled it. He is currently taking coenzyme Q10, clonidine, vitamin D, losartan, magnesium, metoprolol, Remeron, fish oil, and Paxil. He has been working with his neurologist to decrease the dosage of Paxil.    Sleep: He reports sleeping well last night.    Review of Systems   Constitutional: Negative.  Negative for activity change, appetite change, fatigue and fever.   HENT:  Negative for congestion, dental problem, ear discharge, ear pain, mouth sores, rhinorrhea, sinus pressure, sinus pain, sore throat, tinnitus and trouble swallowing.    Eyes:  Negative for photophobia, pain and visual disturbance.   Respiratory:  Negative for cough, chest tightness,  "shortness of breath and stridor.    Cardiovascular:  Negative for chest pain and palpitations.   Gastrointestinal:  Negative for abdominal distention, abdominal pain, blood in stool, constipation, diarrhea and rectal pain.   Endocrine: Negative for cold intolerance, heat intolerance, polydipsia, polyphagia and polyuria.   Genitourinary:  Negative for dysuria, flank pain, hematuria and urgency.   Musculoskeletal:  Negative for arthralgias, gait problem, myalgias and neck stiffness.   Skin:  Negative for color change and rash.   Allergic/Immunologic: Negative for environmental allergies and food allergies.   Neurological:  Negative for dizziness, seizures, syncope, speech difficulty and headaches.   Hematological:  Negative for adenopathy.   Psychiatric/Behavioral:  Negative for agitation, confusion, decreased concentration, dysphoric mood and sleep disturbance. The patient is not nervous/anxious.    The above were reviewed and noted negative except as noted in HPI and Problem List.    Objective     /70 (BP Location: Left arm, Patient Position: Sitting, BP Cuff Size: Large adult)   Pulse (!) 47   Temp 36.7 °C (98 °F) (Temporal)   Resp 16   Ht 1.93 m (6' 4\")   Wt 106 kg (233 lb)   SpO2 97%   BMI 28.36 kg/m²      Physical Exam  Respiratory: Clear to auscultation, no wheezing, rales or rhonchi  Extremities: Deformity and tenderness noted in the right bicep region  Musculoskeletal: Ruptured bicep tendon in the right arm with visible deformity  Constitutional: Well developed, well nourished, alert and in no acute distress   Eyes: Normal external exam. Pupils equally round and reactive to light with normal accommodation and extraocular movements intact.  Neck: Supple, no lymphadenopathy or masses.   Cardiovascular: Regular rate and rhythm, normal S1 and S2, no murmurs, gallops, or rubs. Radial pulses normal. No peripheral edema.  Pulmonary: No respiratory distress, lungs clear to auscultation bilaterally. No " wheezes, rhonchi, rales.  Abdomen: soft,non tender, non distended, without masses or HSM  Skin: Warm, well perfused, normal skin turgor and color.   Neurologic: Cranial nerves II-XII grossly intact.   Psychiatric: Mood calm and affect normal  Musculoskeletal: Moving all extremities without restriction  The above were reviewed and noted negative except as noted in HPI and Problem List.    Problem List Items Addressed This Visit       Primary hypertension    Severe obstructive sleep apnea     Other Visit Diagnoses         Biceps muscle tear, right, initial encounter    -  Primary    Relevant Orders    Referral to Orthopedics and Sports Medicine    MR shoulder right wo IV contrast             Assessment & Plan  1. Right bicep tendon rupture.  - Reports pain scale of 7 out of 10, injury sustained on 07/11/2025 while doing yard work.  - Physical examination suggests a partial tear with noted deformity.  - Urgent referral to orthopedics at the [facility] for further evaluation and management. MRI ordered to confirm the extent of the injury (partial vs complete tear).  - Conservative management with physical therapy may be recommended for partial tear; surgical intervention may be necessary for complete tear.    2. Medication management.  - Currently taking coenzyme Q10, clonidine, vitamin D, losartan, magnesium, metoprolol, Remeron, fish oil, and Paxil.  - Not taking Zyrtec for allergies as it has not been refilled.  - Advised to continue current medications as prescribed. Advised to discontinue Paxil as previously discussed with neurologist.    Follow-up  - A virtual follow-up appointment will be scheduled in approximately 1 week to monitor progress.    Results         Elpidio Jean Baptiste DO     This medical note was created with the assistance of artificial intelligence (AI) for documentation purposes. The content has been reviewed and confirmed by the healthcare provider for accuracy and completeness. Patient  consented to the use of audio recording and use of AI during their visit.

## 2025-07-15 NOTE — PATIENT INSTRUCTIONS
Follow up in 7 days    Continue current medications and therapy for chronic medical conditions.    Patient was advised importance of proper diet/nutrition in addition adequate hydration. Patient was encouraged moderate exercise program to include 30 minutes daily for 5 days of the week or 150 minutes weekly. Patient will follow-up with us as scheduled.

## 2025-07-22 ENCOUNTER — APPOINTMENT (OUTPATIENT)
Dept: PRIMARY CARE | Facility: CLINIC | Age: 50
End: 2025-07-22

## 2025-07-29 DIAGNOSIS — I10 PRIMARY HYPERTENSION: ICD-10-CM

## 2025-07-29 RX ORDER — METOPROLOL SUCCINATE 25 MG/1
TABLET, EXTENDED RELEASE ORAL
Qty: 90 TABLET | Refills: 0 | Status: SHIPPED | OUTPATIENT
Start: 2025-07-29

## 2025-07-29 NOTE — TELEPHONE ENCOUNTER
Recent Visits  Date Type Provider Dept   07/15/25 Office Visit Elpidio Jean Baptiste, DO [facility]   05/29/25 Office Visit Elpidio Jean Baptiste, DO [facility]   02/04/25 Office Visit Elpidio Jean Baptiste, DO [facility]   Showing recent visits within past 180 days and meeting all other requirements  Future Appointments  Date Type Provider Dept   10/15/25 Appointment Elpidio Jean Baptiste, DO [facility]   Showing future appointments within next 90 days and meeting all other requirements

## 2025-10-15 ENCOUNTER — APPOINTMENT (OUTPATIENT)
Dept: PRIMARY CARE | Facility: CLINIC | Age: 50
End: 2025-10-15